# Patient Record
Sex: MALE | Race: WHITE | NOT HISPANIC OR LATINO | Employment: OTHER | URBAN - METROPOLITAN AREA
[De-identification: names, ages, dates, MRNs, and addresses within clinical notes are randomized per-mention and may not be internally consistent; named-entity substitution may affect disease eponyms.]

---

## 2023-03-17 ENCOUNTER — TELEPHONE (OUTPATIENT)
Dept: PRIMARY CARE | Facility: CLINIC | Age: 83
End: 2023-03-17
Payer: MEDICARE

## 2023-03-21 DIAGNOSIS — E78.1 HYPERTRIGLYCERIDEMIA: ICD-10-CM

## 2023-03-21 DIAGNOSIS — N18.9 CHRONIC KIDNEY DISEASE, UNSPECIFIED CKD STAGE: ICD-10-CM

## 2023-03-21 DIAGNOSIS — N42.9 BENIGN PROSTATIC DISEASE: ICD-10-CM

## 2023-04-13 ENCOUNTER — LAB (OUTPATIENT)
Dept: LAB | Facility: LAB | Age: 83
End: 2023-04-13
Payer: MEDICARE

## 2023-04-13 DIAGNOSIS — N18.9 CHRONIC KIDNEY DISEASE, UNSPECIFIED CKD STAGE: ICD-10-CM

## 2023-04-13 DIAGNOSIS — E78.1 HYPERTRIGLYCERIDEMIA: ICD-10-CM

## 2023-04-13 DIAGNOSIS — N42.9 BENIGN PROSTATIC DISEASE: ICD-10-CM

## 2023-04-13 LAB
ALANINE AMINOTRANSFERASE (SGPT) (U/L) IN SER/PLAS: 16 U/L (ref 10–52)
ALBUMIN (G/DL) IN SER/PLAS: 3.9 G/DL (ref 3.4–5)
ALKALINE PHOSPHATASE (U/L) IN SER/PLAS: 52 U/L (ref 33–136)
ANION GAP IN SER/PLAS: 14 MMOL/L (ref 10–20)
ASPARTATE AMINOTRANSFERASE (SGOT) (U/L) IN SER/PLAS: 17 U/L (ref 9–39)
BASOPHILS (10*3/UL) IN BLOOD BY AUTOMATED COUNT: 0.06 X10E9/L (ref 0–0.1)
BASOPHILS/100 LEUKOCYTES IN BLOOD BY AUTOMATED COUNT: 1 % (ref 0–2)
BILIRUBIN TOTAL (MG/DL) IN SER/PLAS: 0.6 MG/DL (ref 0–1.2)
CALCIUM (MG/DL) IN SER/PLAS: 9.7 MG/DL (ref 8.6–10.6)
CARBON DIOXIDE, TOTAL (MMOL/L) IN SER/PLAS: 25 MMOL/L (ref 21–32)
CHLORIDE (MMOL/L) IN SER/PLAS: 108 MMOL/L (ref 98–107)
CHOLESTEROL (MG/DL) IN SER/PLAS: 131 MG/DL (ref 0–199)
CHOLESTEROL IN HDL (MG/DL) IN SER/PLAS: 34.9 MG/DL
CHOLESTEROL/HDL RATIO: 3.8
CREATININE (MG/DL) IN SER/PLAS: 1.88 MG/DL (ref 0.5–1.3)
EOSINOPHILS (10*3/UL) IN BLOOD BY AUTOMATED COUNT: 0.39 X10E9/L (ref 0–0.4)
EOSINOPHILS/100 LEUKOCYTES IN BLOOD BY AUTOMATED COUNT: 6.6 % (ref 0–6)
ERYTHROCYTE DISTRIBUTION WIDTH (RATIO) BY AUTOMATED COUNT: 13.7 % (ref 11.5–14.5)
ERYTHROCYTE MEAN CORPUSCULAR HEMOGLOBIN CONCENTRATION (G/DL) BY AUTOMATED: 32.2 G/DL (ref 32–36)
ERYTHROCYTE MEAN CORPUSCULAR VOLUME (FL) BY AUTOMATED COUNT: 101 FL (ref 80–100)
ERYTHROCYTES (10*6/UL) IN BLOOD BY AUTOMATED COUNT: 4.43 X10E12/L (ref 4.5–5.9)
GFR MALE: 35 ML/MIN/1.73M2
GLUCOSE (MG/DL) IN SER/PLAS: 93 MG/DL (ref 74–99)
HEMATOCRIT (%) IN BLOOD BY AUTOMATED COUNT: 44.7 % (ref 41–52)
HEMOGLOBIN (G/DL) IN BLOOD: 14.4 G/DL (ref 13.5–17.5)
IMMATURE GRANULOCYTES/100 LEUKOCYTES IN BLOOD BY AUTOMATED COUNT: 0.3 % (ref 0–0.9)
LDL: 52 MG/DL (ref 0–99)
LEUKOCYTES (10*3/UL) IN BLOOD BY AUTOMATED COUNT: 5.9 X10E9/L (ref 4.4–11.3)
LYMPHOCYTES (10*3/UL) IN BLOOD BY AUTOMATED COUNT: 2.26 X10E9/L (ref 0.8–3)
LYMPHOCYTES/100 LEUKOCYTES IN BLOOD BY AUTOMATED COUNT: 38.5 % (ref 13–44)
MONOCYTES (10*3/UL) IN BLOOD BY AUTOMATED COUNT: 0.57 X10E9/L (ref 0.05–0.8)
MONOCYTES/100 LEUKOCYTES IN BLOOD BY AUTOMATED COUNT: 9.7 % (ref 2–10)
NEUTROPHILS (10*3/UL) IN BLOOD BY AUTOMATED COUNT: 2.57 X10E9/L (ref 1.6–5.5)
NEUTROPHILS/100 LEUKOCYTES IN BLOOD BY AUTOMATED COUNT: 43.9 % (ref 40–80)
NON HDL CHOLESTEROL: 96 MG/DL
NRBC (PER 100 WBCS) BY AUTOMATED COUNT: 0 /100 WBC (ref 0–0)
PLATELETS (10*3/UL) IN BLOOD AUTOMATED COUNT: 154 X10E9/L (ref 150–450)
POTASSIUM (MMOL/L) IN SER/PLAS: 5.1 MMOL/L (ref 3.5–5.3)
PROSTATE SPECIFIC AG (NG/ML) IN SER/PLAS: 0.33 NG/ML (ref 0–4)
PROTEIN TOTAL: 7.3 G/DL (ref 6.4–8.2)
SODIUM (MMOL/L) IN SER/PLAS: 142 MMOL/L (ref 136–145)
THYROTROPIN (MIU/L) IN SER/PLAS BY DETECTION LIMIT <= 0.05 MIU/L: 5.73 MIU/L (ref 0.44–3.98)
THYROXINE (T4) FREE (NG/DL) IN SER/PLAS: 0.91 NG/DL (ref 0.78–1.48)
TRIGLYCERIDE (MG/DL) IN SER/PLAS: 223 MG/DL (ref 0–149)
UREA NITROGEN (MG/DL) IN SER/PLAS: 29 MG/DL (ref 6–23)
VLDL: 45 MG/DL (ref 0–40)

## 2023-04-13 PROCEDURE — 84153 ASSAY OF PSA TOTAL: CPT

## 2023-04-13 PROCEDURE — 80061 LIPID PANEL: CPT

## 2023-04-13 PROCEDURE — 36415 COLL VENOUS BLD VENIPUNCTURE: CPT

## 2023-04-13 PROCEDURE — 84439 ASSAY OF FREE THYROXINE: CPT

## 2023-04-13 PROCEDURE — 84443 ASSAY THYROID STIM HORMONE: CPT

## 2023-04-13 PROCEDURE — 80053 COMPREHEN METABOLIC PANEL: CPT

## 2023-04-13 PROCEDURE — 85025 COMPLETE CBC W/AUTO DIFF WBC: CPT

## 2023-04-20 ENCOUNTER — OFFICE VISIT (OUTPATIENT)
Dept: PRIMARY CARE | Facility: CLINIC | Age: 83
End: 2023-04-20
Payer: MEDICARE

## 2023-04-20 VITALS
HEART RATE: 64 BPM | TEMPERATURE: 97.7 F | WEIGHT: 224 LBS | OXYGEN SATURATION: 96 % | BODY MASS INDEX: 35.16 KG/M2 | SYSTOLIC BLOOD PRESSURE: 130 MMHG | HEIGHT: 67 IN | DIASTOLIC BLOOD PRESSURE: 70 MMHG

## 2023-04-20 DIAGNOSIS — M21.372 LEFT FOOT DROP: ICD-10-CM

## 2023-04-20 DIAGNOSIS — G62.89 OTHER POLYNEUROPATHY: ICD-10-CM

## 2023-04-20 DIAGNOSIS — M54.16 LEFT LUMBAR RADICULOPATHY: ICD-10-CM

## 2023-04-20 DIAGNOSIS — Z00.00 ENCOUNTER FOR ANNUAL WELLNESS VISIT (AWV) IN MEDICARE PATIENT: Primary | ICD-10-CM

## 2023-04-20 DIAGNOSIS — Z71.89 ADVANCE DIRECTIVE DISCUSSED WITH PATIENT: ICD-10-CM

## 2023-04-20 DIAGNOSIS — E66.01 MORBID OBESITY (MULTI): ICD-10-CM

## 2023-04-20 DIAGNOSIS — R06.02 SHORTNESS OF BREATH: ICD-10-CM

## 2023-04-20 DIAGNOSIS — H35.033 HYPERTENSIVE RETINOPATHY, BILATERAL: ICD-10-CM

## 2023-04-20 DIAGNOSIS — I69.359 HEMIPARESIS AFFECTING DOMINANT SIDE AS LATE EFFECT OF CEREBROVASCULAR ACCIDENT (MULTI): ICD-10-CM

## 2023-04-20 DIAGNOSIS — Z13.89 ENCOUNTER FOR SCREENING FOR OTHER DISORDER: ICD-10-CM

## 2023-04-20 DIAGNOSIS — Z71.89 CARDIAC RISK COUNSELING: ICD-10-CM

## 2023-04-20 DIAGNOSIS — I10 BENIGN ESSENTIAL HYPERTENSION: ICD-10-CM

## 2023-04-20 DIAGNOSIS — Z00.00 ROUTINE GENERAL MEDICAL EXAMINATION AT HEALTH CARE FACILITY: ICD-10-CM

## 2023-04-20 DIAGNOSIS — N18.32 STAGE 3B CHRONIC KIDNEY DISEASE (MULTI): ICD-10-CM

## 2023-04-20 DIAGNOSIS — L30.9 DERMATITIS: ICD-10-CM

## 2023-04-20 DIAGNOSIS — N40.0 BENIGN PROSTATIC HYPERPLASIA WITHOUT LOWER URINARY TRACT SYMPTOMS: ICD-10-CM

## 2023-04-20 PROBLEM — M10.9 GOUT: Status: ACTIVE | Noted: 2023-04-20

## 2023-04-20 PROBLEM — E53.8 VITAMIN B12 DEFICIENCY: Status: ACTIVE | Noted: 2023-04-20

## 2023-04-20 PROBLEM — F41.9 ANXIETY: Status: ACTIVE | Noted: 2023-04-20

## 2023-04-20 PROBLEM — N18.9 CHRONIC KIDNEY DISEASE: Status: RESOLVED | Noted: 2023-04-20 | Resolved: 2023-04-20

## 2023-04-20 PROBLEM — R91.1 LUNG NODULE: Status: ACTIVE | Noted: 2023-04-20

## 2023-04-20 PROBLEM — M79.2 NEURALGIA: Status: ACTIVE | Noted: 2023-04-20

## 2023-04-20 PROBLEM — G62.9 PERIPHERAL NEUROPATHY: Status: ACTIVE | Noted: 2023-04-20

## 2023-04-20 PROBLEM — M10.9 GOUT: Status: RESOLVED | Noted: 2023-04-20 | Resolved: 2023-04-20

## 2023-04-20 PROBLEM — R91.1 LUNG NODULE: Status: RESOLVED | Noted: 2023-04-20 | Resolved: 2023-04-20

## 2023-04-20 PROBLEM — M79.605 LEFT LEG PAIN: Status: ACTIVE | Noted: 2023-04-20

## 2023-04-20 PROBLEM — F41.9 ANXIETY: Status: RESOLVED | Noted: 2023-04-20 | Resolved: 2023-04-20

## 2023-04-20 PROBLEM — N18.9 CHRONIC KIDNEY DISEASE: Status: ACTIVE | Noted: 2023-04-20

## 2023-04-20 PROCEDURE — G0444 DEPRESSION SCREEN ANNUAL: HCPCS | Performed by: FAMILY MEDICINE

## 2023-04-20 PROCEDURE — G0439 PPPS, SUBSEQ VISIT: HCPCS | Performed by: FAMILY MEDICINE

## 2023-04-20 PROCEDURE — 99497 ADVNCD CARE PLAN 30 MIN: CPT | Performed by: FAMILY MEDICINE

## 2023-04-20 PROCEDURE — 1160F RVW MEDS BY RX/DR IN RCRD: CPT | Performed by: FAMILY MEDICINE

## 2023-04-20 PROCEDURE — 3078F DIAST BP <80 MM HG: CPT | Performed by: FAMILY MEDICINE

## 2023-04-20 PROCEDURE — 99214 OFFICE O/P EST MOD 30 MIN: CPT | Performed by: FAMILY MEDICINE

## 2023-04-20 PROCEDURE — 1170F FXNL STATUS ASSESSED: CPT | Performed by: FAMILY MEDICINE

## 2023-04-20 PROCEDURE — 99397 PER PM REEVAL EST PAT 65+ YR: CPT | Performed by: FAMILY MEDICINE

## 2023-04-20 PROCEDURE — G0446 INTENS BEHAVE THER CARDIO DX: HCPCS | Performed by: FAMILY MEDICINE

## 2023-04-20 PROCEDURE — 1159F MED LIST DOCD IN RCRD: CPT | Performed by: FAMILY MEDICINE

## 2023-04-20 PROCEDURE — 1036F TOBACCO NON-USER: CPT | Performed by: FAMILY MEDICINE

## 2023-04-20 PROCEDURE — 3075F SYST BP GE 130 - 139MM HG: CPT | Performed by: FAMILY MEDICINE

## 2023-04-20 RX ORDER — ASPIRIN 81 MG/1
81 TABLET ORAL DAILY
COMMUNITY
Start: 2018-11-26

## 2023-04-20 RX ORDER — GABAPENTIN 300 MG/1
300 CAPSULE ORAL
COMMUNITY
End: 2023-08-07

## 2023-04-20 RX ORDER — LOSARTAN POTASSIUM 50 MG/1
50 TABLET ORAL DAILY
COMMUNITY
End: 2023-05-08 | Stop reason: SDUPTHER

## 2023-04-20 RX ORDER — MIRTAZAPINE 15 MG/1
15 TABLET, FILM COATED ORAL NIGHTLY
COMMUNITY
End: 2023-05-08 | Stop reason: SDUPTHER

## 2023-04-20 RX ORDER — TRIAMCINOLONE ACETONIDE 1 MG/G
CREAM TOPICAL 2 TIMES DAILY
Qty: 454 G | Refills: 0 | Status: SHIPPED | OUTPATIENT
Start: 2023-04-20

## 2023-04-20 RX ORDER — CLOPIDOGREL BISULFATE 75 MG/1
75 TABLET ORAL DAILY
COMMUNITY
End: 2023-06-01 | Stop reason: SDUPTHER

## 2023-04-20 RX ORDER — BUSPIRONE HYDROCHLORIDE 15 MG/1
15 TABLET ORAL EVERY 12 HOURS
COMMUNITY
End: 2023-05-08 | Stop reason: SDUPTHER

## 2023-04-20 RX ORDER — ALLOPURINOL 100 MG/1
100 TABLET ORAL DAILY
COMMUNITY

## 2023-04-20 RX ORDER — SIMVASTATIN 20 MG/1
20 TABLET, FILM COATED ORAL DAILY
COMMUNITY
End: 2023-06-01 | Stop reason: SDUPTHER

## 2023-04-20 ASSESSMENT — ENCOUNTER SYMPTOMS
LIGHT-HEADEDNESS: 0
CHEST TIGHTNESS: 0
VOICE CHANGE: 0
ABDOMINAL PAIN: 0
PALPITATIONS: 0
ARTHRALGIAS: 0
SEIZURES: 0
RHINORRHEA: 1
SPEECH DIFFICULTY: 0
NAUSEA: 0
BACK PAIN: 0
POLYDIPSIA: 0
ANAL BLEEDING: 0
EYE PAIN: 0
ADENOPATHY: 0
COUGH: 0
CARDIOVASCULAR NEGATIVE: 1
TROUBLE SWALLOWING: 0
NERVOUS/ANXIOUS: 0
WOUND: 0
VOMITING: 0
FATIGUE: 0
FACIAL ASYMMETRY: 0
MYALGIAS: 0
SINUS PAIN: 0
APPETITE CHANGE: 0
AGITATION: 0
CHILLS: 0
FLANK PAIN: 0
SORE THROAT: 0
FREQUENCY: 0
DECREASED CONCENTRATION: 0
DYSURIA: 0
ABDOMINAL DISTENTION: 0
NUMBNESS: 1
WEAKNESS: 1
LOSS OF SENSATION IN FEET: 0
EYE ITCHING: 0
DIAPHORESIS: 0
CONSTIPATION: 0
SLEEP DISTURBANCE: 0
DIZZINESS: 0
BLOOD IN STOOL: 0
EYE DISCHARGE: 0
EYE REDNESS: 0
BRUISES/BLEEDS EASILY: 0
HEADACHES: 0
DEPRESSION: 0
DIARRHEA: 0
SINUS PRESSURE: 0
DIFFICULTY URINATING: 0
OCCASIONAL FEELINGS OF UNSTEADINESS: 1
UNEXPECTED WEIGHT CHANGE: 0

## 2023-04-20 ASSESSMENT — ACTIVITIES OF DAILY LIVING (ADL)
MANAGING_FINANCES: INDEPENDENT
BATHING: INDEPENDENT
GROCERY_SHOPPING: INDEPENDENT
DRESSING: INDEPENDENT
DOING_HOUSEWORK: INDEPENDENT
TAKING_MEDICATION: INDEPENDENT

## 2023-04-20 ASSESSMENT — PATIENT HEALTH QUESTIONNAIRE - PHQ9
SUM OF ALL RESPONSES TO PHQ9 QUESTIONS 1 AND 2: 0
1. LITTLE INTEREST OR PLEASURE IN DOING THINGS: NOT AT ALL
2. FEELING DOWN, DEPRESSED OR HOPELESS: NOT AT ALL

## 2023-04-20 NOTE — PROGRESS NOTES
Subjective   Reason for Visit: Melchor Fang is an 83 y.o. male here for a Medicare Wellness visit.     Past Medical, Surgical, and Family History reviewed and updated in chart.    Reviewed all medications by prescribing practitioner or clinical pharmacist (such as prescriptions, OTCs, herbal therapies and supplements) and documented in the medical record.  Eye exam UTD    False teeth does not see the dentist on a regular basis.    Is up-to-date on eye exams.  Drinks 3-4 beers a day.    Non-smoker.  Exercises by walking to the garden regularly.    Patient had no difficulties with headaches or double vision or blurry vision no chest pain or shortness of breath.  No abdominal pain or discomfort.  No troubles with swelling of the legs or feet.  No fevers no chills or night sweats.  No troubles with nausea or vomiting.    Patient had some persistent right hemiparesis from after having a stroke almost all completely recovered still cannot write well with the right hand.  Has had a little bit of weakness down into the right leg.  Patient has some pain discomfort in the left leg that was noted to have a postherpetic neuralgia    Non Smoker.    3-4 beers a day  HPI  Having trouble with feet had plantar fascitis.  Took some prednisone.    Patient Care Team:  Tiago Troy DO as PCP - General  Tiago Troy DO as PCP - [employer] PCP     Review of Systems   Constitutional:  Negative for appetite change, chills, diaphoresis, fatigue and unexpected weight change.   HENT:  Positive for rhinorrhea. Negative for congestion, dental problem, ear discharge, ear pain, hearing loss, mouth sores, nosebleeds, postnasal drip, sinus pressure, sinus pain, sore throat, tinnitus, trouble swallowing and voice change.    Eyes:  Negative for pain, discharge, redness, itching and visual disturbance.        Had  retinopathy   Respiratory:  Negative for cough and chest tightness.    Cardiovascular: Negative.  Negative for  "chest pain, palpitations and leg swelling.   Gastrointestinal:  Negative for abdominal distention, abdominal pain, anal bleeding, blood in stool, constipation, diarrhea, nausea and vomiting.   Endocrine: Negative for cold intolerance, heat intolerance, polydipsia and polyuria.   Genitourinary:  Negative for difficulty urinating, dysuria, enuresis (once at night), flank pain, frequency, scrotal swelling and testicular pain.   Musculoskeletal:  Negative for arthralgias, back pain and myalgias.   Skin:  Negative for rash and wound.   Allergic/Immunologic: Negative for environmental allergies, food allergies and immunocompromised state.   Neurological:  Positive for weakness and numbness. Negative for dizziness, seizures, facial asymmetry, speech difficulty, light-headedness and headaches.   Hematological:  Negative for adenopathy. Does not bruise/bleed easily.   Psychiatric/Behavioral:  Negative for agitation, behavioral problems, decreased concentration, sleep disturbance and suicidal ideas. The patient is not nervous/anxious.        Objective   Vitals:  /70   Pulse 64   Temp 36.5 °C (97.7 °F)   Ht 1.689 m (5' 6.5\")   Wt 102 kg (224 lb)   SpO2 96%   BMI 35.61 kg/m²       Physical Exam  Constitutional:       General: He is not in acute distress.     Appearance: Normal appearance. He is obese. He is not ill-appearing or toxic-appearing.   HENT:      Head: Normocephalic.      Right Ear: Tympanic membrane normal.      Left Ear: Tympanic membrane normal.      Nose: Nose normal.      Mouth/Throat:      Mouth: Mucous membranes are moist.   Eyes:      Extraocular Movements: Extraocular movements intact.      Conjunctiva/sclera: Conjunctivae normal.      Pupils: Pupils are equal, round, and reactive to light.   Cardiovascular:      Rate and Rhythm: Normal rate and regular rhythm.      Pulses: Normal pulses.      Heart sounds: Normal heart sounds.   Pulmonary:      Effort: Pulmonary effort is normal.      Breath " sounds: Normal breath sounds. No wheezing or rhonchi.   Abdominal:      General: Abdomen is flat. Bowel sounds are normal. There is no distension.      Palpations: Abdomen is soft.      Tenderness: There is no abdominal tenderness. There is no right CVA tenderness or rebound.      Hernia: No hernia is present.   Genitourinary:     Penis: Normal.    Musculoskeletal:         General: Normal range of motion.      Cervical back: Normal range of motion.      Right lower leg: No edema.   Skin:     General: Skin is warm and dry.      Capillary Refill: Capillary refill takes less than 2 seconds.      Findings: No bruising.   Neurological:      General: No focal deficit present.      Mental Status: He is alert and oriented to person, place, and time. Mental status is at baseline.      Cranial Nerves: No cranial nerve deficit.      Sensory: No sensory deficit.      Motor: No weakness.      Coordination: Coordination normal.      Gait: Gait normal.      Deep Tendon Reflexes: Reflexes normal.      Comments: Some shuffling of the gait noted.    Handgrip appears to be strong bilaterally.  Slight incoordination of right hand.    The right leg reveals good range of motion.  Left leg reveals good range of motion.  Deep tendon reflexes are equal and strong bilaterally.    Finger-nose testing intact as well.   Psychiatric:         Mood and Affect: Mood normal.         Behavior: Behavior normal.     Some issues with shuffling of the gait.    Data reflexes are equal and strong.    Some dermatitis noted of the skin on the back area.    Refilling cream to be used as noted in the triamcinolone cream will be used as needed.  Cardiovascular risk discussed and, if needed, life style modifications recommended, including nutritional choices, excercise,and elimination of habits contributing to risk. See ASCVD risk  plus for data discussed regarding risk and risk reduction opportunities. Aspirin use/disuse was discussed after reviewing  updated guidelines.    Assessment/Plan   Problem List Items Addressed This Visit          Nervous    Left lumbar radiculopathy    Peripheral neuropathy       Genitourinary    Stage 3b chronic kidney disease    Benign prostatic hyperplasia without lower urinary tract symptoms       Musculoskeletal    Left foot drop       Endocrine/Metabolic    Morbid obesity (CMS/HCC)       Other    Encounter for annual wellness visit (AWV) in Medicare patient - Primary     Other Visit Diagnoses       Routine general medical examination at health care facility        Hemiparesis affecting dominant side as late effect of cerebrovascular accident (CMS/[facility])        Dermatitis        Relevant Medications    triamcinolone (Kenalog) 0.1 % cream               Advance Care Planning Note     Discussion Date: 04/20/23   Discussion Participants: patient    The patient wishes to discuss Advance Care Planning today and the following is a brief summary of our discussion.     Patient has capacity to make their own medical decisions: Yes  Health Care Agent/Surrogate Decision Maker documented in chart: No    Documents on file and valid:  Advance Directive/Living Will: Yes   Health Care Power of : Yes  Other: no DNR    Communication of Medical Status/Prognosis:   good     Communication of Treatment Goals/Options:   good     Treatment Decisions  good    Time Statement: Total face to face time spent on advance care planning was 16 minutes with 16 minutes spent in counseling, including the explanation.    Tiago rToy, DO

## 2023-04-20 NOTE — ASSESSMENT & PLAN NOTE
Please continue to work toward weight loss.  Please try to exercise 40 minutes 4 times weekly and watch diet closely

## 2023-04-20 NOTE — PATIENT INSTRUCTIONS
Reviewed labs with you today.    TSH is slightly elevated, recheck TSH level in 4 weeks.    I have recommended seeing neurology regarding persistent troubles with postherpetic neuralgia but you do not want to do so.    Please continue to use a cane for walking.    Depression has been under good control.  Labs otherwise been reviewed and stable.  EKG is being performed as well.    Please cut back alcohol intake to 1 drink daily.    Please follow 1500-calorie diet to try to help with weight reduction

## 2023-05-08 ENCOUNTER — TELEPHONE (OUTPATIENT)
Dept: PRIMARY CARE | Facility: CLINIC | Age: 83
End: 2023-05-08
Payer: MEDICARE

## 2023-05-08 DIAGNOSIS — I10 BENIGN ESSENTIAL HYPERTENSION: ICD-10-CM

## 2023-05-08 DIAGNOSIS — G47.00 INSOMNIA, UNSPECIFIED TYPE: ICD-10-CM

## 2023-05-08 PROBLEM — K62.5 RECTAL BLEEDING: Status: ACTIVE | Noted: 2023-05-08

## 2023-05-08 RX ORDER — BUSPIRONE HYDROCHLORIDE 15 MG/1
15 TABLET ORAL EVERY 12 HOURS
Qty: 90 TABLET | Refills: 0 | Status: SHIPPED | OUTPATIENT
Start: 2023-05-08 | End: 2023-05-26

## 2023-05-08 RX ORDER — LOSARTAN POTASSIUM 50 MG/1
50 TABLET ORAL DAILY
Qty: 90 TABLET | Refills: 0 | Status: SHIPPED | OUTPATIENT
Start: 2023-05-08 | End: 2023-05-26

## 2023-05-08 RX ORDER — MIRTAZAPINE 15 MG/1
15 TABLET, FILM COATED ORAL NIGHTLY
Qty: 90 TABLET | Refills: 0 | Status: SHIPPED | OUTPATIENT
Start: 2023-05-08 | End: 2023-05-26

## 2023-05-08 NOTE — TELEPHONE ENCOUNTER
Pt needs refills on     losartan (Cozaar) 50 mg tablet  Take 1 tablet (50 mg) by mouth once daily    losartan (Cozaar) 50 mg tablet     busPIRone (Buspar) 15 mg tablet     mirtazapine (Remeron) 15 mg tablet     [facility]

## 2023-05-26 DIAGNOSIS — G47.00 INSOMNIA, UNSPECIFIED TYPE: ICD-10-CM

## 2023-05-26 DIAGNOSIS — I10 BENIGN ESSENTIAL HYPERTENSION: ICD-10-CM

## 2023-05-26 RX ORDER — LOSARTAN POTASSIUM 50 MG/1
50 TABLET ORAL DAILY
Qty: 90 TABLET | Refills: 0 | Status: SHIPPED | OUTPATIENT
Start: 2023-05-26 | End: 2023-08-28 | Stop reason: SDUPTHER

## 2023-05-26 RX ORDER — MIRTAZAPINE 15 MG/1
15 TABLET, FILM COATED ORAL NIGHTLY
Qty: 90 TABLET | Refills: 0 | Status: SHIPPED | OUTPATIENT
Start: 2023-05-26 | End: 2023-10-28

## 2023-05-26 RX ORDER — BUSPIRONE HYDROCHLORIDE 15 MG/1
15 TABLET ORAL EVERY 12 HOURS
Qty: 90 TABLET | Refills: 0 | Status: SHIPPED | OUTPATIENT
Start: 2023-05-26 | End: 2023-08-10

## 2023-05-31 ENCOUNTER — TELEPHONE (OUTPATIENT)
Dept: PRIMARY CARE | Facility: CLINIC | Age: 83
End: 2023-05-31
Payer: MEDICARE

## 2023-06-01 DIAGNOSIS — I69.359 HEMIPARESIS AFFECTING DOMINANT SIDE AS LATE EFFECT OF CEREBROVASCULAR ACCIDENT (MULTI): Primary | ICD-10-CM

## 2023-06-01 RX ORDER — CLOPIDOGREL BISULFATE 75 MG/1
75 TABLET ORAL DAILY
Qty: 90 TABLET | Refills: 1 | Status: SHIPPED | OUTPATIENT
Start: 2023-06-01 | End: 2023-10-28

## 2023-06-01 RX ORDER — SIMVASTATIN 20 MG/1
20 TABLET, FILM COATED ORAL DAILY
Qty: 90 TABLET | Refills: 1 | Status: SHIPPED | OUTPATIENT
Start: 2023-06-01 | End: 2023-10-28

## 2023-08-05 DIAGNOSIS — G62.9 POLYNEUROPATHY, UNSPECIFIED: ICD-10-CM

## 2023-08-07 RX ORDER — GABAPENTIN 300 MG/1
CAPSULE ORAL
Qty: 180 CAPSULE | Refills: 1 | Status: SHIPPED | OUTPATIENT
Start: 2023-08-07

## 2023-08-09 DIAGNOSIS — G47.00 INSOMNIA, UNSPECIFIED TYPE: ICD-10-CM

## 2023-08-10 RX ORDER — BUSPIRONE HYDROCHLORIDE 15 MG/1
15 TABLET ORAL EVERY 12 HOURS
Qty: 90 TABLET | Refills: 1 | Status: SHIPPED | OUTPATIENT
Start: 2023-08-10 | End: 2023-08-28 | Stop reason: SDUPTHER

## 2023-08-21 ENCOUNTER — LAB (OUTPATIENT)
Dept: LAB | Facility: LAB | Age: 83
End: 2023-08-21
Payer: MEDICARE

## 2023-08-21 DIAGNOSIS — G62.89 OTHER POLYNEUROPATHY: ICD-10-CM

## 2023-08-21 PROCEDURE — 36415 COLL VENOUS BLD VENIPUNCTURE: CPT

## 2023-08-21 PROCEDURE — 84443 ASSAY THYROID STIM HORMONE: CPT

## 2023-08-22 LAB — THYROTROPIN (MIU/L) IN SER/PLAS BY DETECTION LIMIT <= 0.05 MIU/L: 3.86 MIU/L (ref 0.44–3.98)

## 2023-08-28 ENCOUNTER — OFFICE VISIT (OUTPATIENT)
Dept: PRIMARY CARE | Facility: CLINIC | Age: 83
End: 2023-08-28
Payer: MEDICARE

## 2023-08-28 VITALS
DIASTOLIC BLOOD PRESSURE: 68 MMHG | SYSTOLIC BLOOD PRESSURE: 132 MMHG | WEIGHT: 222 LBS | OXYGEN SATURATION: 97 % | HEIGHT: 67 IN | BODY MASS INDEX: 34.84 KG/M2 | TEMPERATURE: 97.2 F | HEART RATE: 103 BPM

## 2023-08-28 DIAGNOSIS — B02.29 POST HERPETIC NEURALGIA: ICD-10-CM

## 2023-08-28 DIAGNOSIS — G62.89 OTHER POLYNEUROPATHY: ICD-10-CM

## 2023-08-28 DIAGNOSIS — M54.16 LEFT LUMBAR RADICULOPATHY: ICD-10-CM

## 2023-08-28 DIAGNOSIS — Z86.73 HISTORY OF COMPLETED STROKE: ICD-10-CM

## 2023-08-28 DIAGNOSIS — R53.1 WEAKNESS: Primary | ICD-10-CM

## 2023-08-28 DIAGNOSIS — G47.00 INSOMNIA, UNSPECIFIED TYPE: ICD-10-CM

## 2023-08-28 DIAGNOSIS — I10 BENIGN ESSENTIAL HYPERTENSION: ICD-10-CM

## 2023-08-28 PROCEDURE — 3075F SYST BP GE 130 - 139MM HG: CPT | Performed by: FAMILY MEDICINE

## 2023-08-28 PROCEDURE — 3078F DIAST BP <80 MM HG: CPT | Performed by: FAMILY MEDICINE

## 2023-08-28 PROCEDURE — 1160F RVW MEDS BY RX/DR IN RCRD: CPT | Performed by: FAMILY MEDICINE

## 2023-08-28 PROCEDURE — 1036F TOBACCO NON-USER: CPT | Performed by: FAMILY MEDICINE

## 2023-08-28 PROCEDURE — 1159F MED LIST DOCD IN RCRD: CPT | Performed by: FAMILY MEDICINE

## 2023-08-28 PROCEDURE — 99214 OFFICE O/P EST MOD 30 MIN: CPT | Performed by: FAMILY MEDICINE

## 2023-08-28 RX ORDER — BUSPIRONE HYDROCHLORIDE 15 MG/1
15 TABLET ORAL EVERY 12 HOURS
Qty: 180 TABLET | Refills: 1 | Status: SHIPPED | OUTPATIENT
Start: 2023-08-28 | End: 2023-09-28

## 2023-08-28 RX ORDER — LOSARTAN POTASSIUM 50 MG/1
50 TABLET ORAL DAILY
Qty: 90 TABLET | Refills: 1 | Status: SHIPPED | OUTPATIENT
Start: 2023-08-28 | End: 2024-04-22

## 2023-08-28 ASSESSMENT — ENCOUNTER SYMPTOMS
LIGHT-HEADEDNESS: 0
DECREASED CONCENTRATION: 0
ARTHRALGIAS: 1
HALLUCINATIONS: 0
SEIZURES: 0
WEAKNESS: 0
CONSTITUTIONAL NEGATIVE: 1
NECK PAIN: 0
RESPIRATORY NEGATIVE: 1
MYALGIAS: 0
BACK PAIN: 1
CARDIOVASCULAR NEGATIVE: 1
DIZZINESS: 0
TREMORS: 0

## 2023-08-28 ASSESSMENT — PATIENT HEALTH QUESTIONNAIRE - PHQ9
1. LITTLE INTEREST OR PLEASURE IN DOING THINGS: NOT AT ALL
SUM OF ALL RESPONSES TO PHQ9 QUESTIONS 1 AND 2: 0
2. FEELING DOWN, DEPRESSED OR HOPELESS: NOT AT ALL

## 2023-08-28 NOTE — PROGRESS NOTES
Subjective   Patient ID: Melchor Fang is a 83 y.o. male who presents for Groin Pain.    Patient frustrated feels like he is getting weaker.  Patient with history of neuropathy and lumbar radiculopathy and postherpetic neuralgia of the left leg also has had history of stroke which affected the right side of the body.  Feels that overall he is getting somewhat weaker.  Wanted to know now if there is anything else he could do and would like to have a referral to neurology.    He does get a little bit of numbness some tingling in the legs and feet.  Did have some burning type sensation on the medial aspect of the left thigh where he had history of postherpetic neuralgia.  Patient did have an MRI of the femur done which showed no acute abnormalities.  Patient had EMG studies and nerve conduction testing and MRI of the LS spine in 2021.  Evidence of degenerative changes.  Evidence of peripheral neuropathy but no evidence of significant radiculopathy was appreciated did undergo physical therapy and was doing somewhat better but now having more difficulty patient had been referred to Dr. Zepeda in the past.        Feeling OK.     Balance off.    See Dr. Riddle        Groin Pain  The patient's pertinent negatives include no penile pain.    patient presents for follow-up on groin pain.  Patient had pain discomfort in groin area.  Leg weakness.    Neuropathy L leg,        Neuropathy.  Review of Systems   Constitutional: Negative.    HENT: Negative.     Respiratory: Negative.     Cardiovascular: Negative.    Genitourinary:  Negative for penile pain.   Musculoskeletal:  Positive for arthralgias, back pain and gait problem. Negative for myalgias and neck pain.   Allergic/Immunologic: Negative for environmental allergies.   Neurological:  Negative for dizziness, tremors, seizures, weakness and light-headedness.   Psychiatric/Behavioral:  Negative for decreased concentration and hallucinations.        Objective   /68    "Pulse 103   Temp 36.2 °C (97.2 °F)   Ht 1.689 m (5' 6.5\")   Wt 101 kg (222 lb)   SpO2 97%   BMI 35.29 kg/m²   BSA Body surface area is 2.18 meters squared.      Physical Exam  Constitutional:       Appearance: Normal appearance.   Cardiovascular:      Rate and Rhythm: Normal rate and regular rhythm.   Pulmonary:      Effort: Pulmonary effort is normal.   Musculoskeletal:         General: Normal range of motion.      Cervical back: No rigidity.   Lymphadenopathy:      Cervical: No cervical adenopathy.   Skin:     General: Skin is warm.   Neurological:      Mental Status: He is alert and oriented to person, place, and time.      Cranial Nerves: No cranial nerve deficit.      Motor: Weakness present.      Gait: Gait abnormal.     Ambulating with cane in the office.    LS-spine revealed some pain with flexing forward with side bending and with rotation.  Deep tendon reflex appear to be diminished bilaterally.  Some weakness of the extensor houses longus tendon on the left side.    Right side revealed some diffuse weakness as well  Lab on 08/21/2023   Component Date Value Ref Range Status    TSH 08/21/2023 3.86  0.44 - 3.98 mIU/L Final     TSH testing is performed using different testing    methodology at [facility] than at other    [facility]. Direct result comparisons should    only be made within the same method.   Lab on 04/13/2023   Component Date Value Ref Range Status    WBC 04/13/2023 5.9  4.4 - 11.3 x10E9/L Final    nRBC 04/13/2023 0.0  0.0 - 0.0 /100 WBC Final    RBC 04/13/2023 4.43 (L)  4.50 - 5.90 x10E12/L Final    Hemoglobin 04/13/2023 14.4  13.5 - 17.5 g/dL Final    Hematocrit 04/13/2023 44.7  41.0 - 52.0 % Final    MCV 04/13/2023 101 (H)  80 - 100 fL Final    MCHC 04/13/2023 32.2  32.0 - 36.0 g/dL Final    Platelets 04/13/2023 154  150 - 450 x10E9/L Final    RDW 04/13/2023 13.7  11.5 - 14.5 % Final    Neutrophils % 04/13/2023 43.9  40.0 - 80.0 % Final    Immature Granulocytes %, " Automated 04/13/2023 0.3  0.0 - 0.9 % Final     Immature Granulocyte Count (IG) includes promyelocytes,    myelocytes and metamyelocytes but does not include bands.   Percent differential counts (%) should be interpreted in the   context of the absolute cell counts (cells/L).    Lymphocytes % 04/13/2023 38.5  13.0 - 44.0 % Final    Monocytes % 04/13/2023 9.7  2.0 - 10.0 % Final    Eosinophils % 04/13/2023 6.6  0.0 - 6.0 % Final    Basophils % 04/13/2023 1.0  0.0 - 2.0 % Final    Neutrophils Absolute 04/13/2023 2.57  1.60 - 5.50 x10E9/L Final    Lymphocytes Absolute 04/13/2023 2.26  0.80 - 3.00 x10E9/L Final    Monocytes Absolute 04/13/2023 0.57  0.05 - 0.80 x10E9/L Final    Eosinophils Absolute 04/13/2023 0.39  0.00 - 0.40 x10E9/L Final    Basophils Absolute 04/13/2023 0.06  0.00 - 0.10 x10E9/L Final    Glucose 04/13/2023 93  74 - 99 mg/dL Final    Sodium 04/13/2023 142  136 - 145 mmol/L Final    Potassium 04/13/2023 5.1  3.5 - 5.3 mmol/L Final    Chloride 04/13/2023 108 (H)  98 - 107 mmol/L Final    Bicarbonate 04/13/2023 25  21 - 32 mmol/L Final    Anion Gap 04/13/2023 14  10 - 20 mmol/L Final    Urea Nitrogen 04/13/2023 29 (H)  6 - 23 mg/dL Final    Creatinine 04/13/2023 1.88 (H)  0.50 - 1.30 mg/dL Final    GFR MALE 04/13/2023 35 (A)  >90 mL/min/1.73m2 Final     CALCULATIONS OF ESTIMATED GFR ARE PERFORMED   USING THE 2021 CKD-EPI STUDY REFIT EQUATION   WITHOUT THE RACE VARIABLE FOR THE IDMS-TRACEABLE   CREATININE METHODS.    https://jasn.asnjournals.org/content/early/2021/09/22/ASN.5649629044    Calcium 04/13/2023 9.7  8.6 - 10.6 mg/dL Final    Albumin 04/13/2023 3.9  3.4 - 5.0 g/dL Final    Alkaline Phosphatase 04/13/2023 52  33 - 136 U/L Final    Total Protein 04/13/2023 7.3  6.4 - 8.2 g/dL Final    AST 04/13/2023 17  9 - 39 U/L Final    Total Bilirubin 04/13/2023 0.6  0.0 - 1.2 mg/dL Final    ALT (SGPT) 04/13/2023 16  10 - 52 U/L Final     Patients treated with Sulfasalazine may generate    falsely decreased  results for ALT.    TSH 04/13/2023 5.73 (H)  0.44 - 3.98 mIU/L Final     TSH testing is performed using different testing    methodology at [facility] than at other    [facility]. Direct result comparisons should    only be made within the same method.    Cholesterol 04/13/2023 131  0 - 199 mg/dL Final    .      AGE      DESIRABLE   BORDERLINE HIGH   HIGH     0-19 Y     0 - 169       170 - 199     >/= 200    20-24 Y     0 - 189       190 - 224     >/= 225         >24 Y     0 - 199       200 - 239     >/= 240   **All ranges are based on fasting samples. Specific   therapeutic targets will vary based on patient-specific   cardiac risk.  .   Pediatric guidelines reference:Pediatrics 2011, 128(S5).   Adult guidelines reference: NCEP ATPIII Guidelines,     HERB 2001, 258:2486-97  .   Venipuncture immediately after or during the    administration of Metamizole may lead to falsely   low results. Testing should be performed immediately   prior to Metamizole dosing.    HDL 04/13/2023 34.9 (A)  mg/dL Final    .      AGE      VERY LOW   LOW     NORMAL    HIGH       0-19 Y       < 35   < 40     40-45     ----    20-24 Y       ----   < 40       >45     ----      >24 Y       ----   < 40     40-60      >60  .    Cholesterol/HDL Ratio 04/13/2023 3.8   Final    REF VALUES  DESIRABLE  < 3.4  HIGH RISK  > 5.0    LDL 04/13/2023 52  0 - 99 mg/dL Final    .                           NEAR      BORD      AGE      DESIRABLE  OPTIMAL    HIGH     HIGH     VERY HIGH     0-19 Y     0 - 109     ---    110-129   >/= 130     ----    20-24 Y     0 - 119     ---    120-159   >/= 160     ----      >24 Y     0 -  99   100-129  130-159   160-189     >/=190  .    VLDL 04/13/2023 45 (H)  0 - 40 mg/dL Final    Triglycerides 04/13/2023 223 (H)  0 - 149 mg/dL Final    .      AGE      DESIRABLE   BORDERLINE HIGH   HIGH     VERY HIGH   0 D-90 D    19 - 174         ----         ----        ----  91 D- 9 Y     0 -  74        75 -  99     >/=  100      ----    10-19 Y     0 -  89        90 - 129     >/= 130      ----    20-24 Y     0 - 114       115 - 149     >/= 150      ----         >24 Y     0 - 149       150 - 199    200- 499    >/= 500  .   Venipuncture immediately after or during the    administration of Metamizole may lead to falsely   low results. Testing should be performed immediately   prior to Metamizole dosing.    Non HDL Cholesterol 04/13/2023 96  mg/dL Final        AGE      DESIRABLE   BORDERLINE HIGH   HIGH     VERY HIGH     0-19 Y     0 - 119       120 - 144     >/= 145    >/= 160    20-24 Y     0 - 149       150 - 189     >/= 190      ----         >24 Y    30 MG/DL ABOVE LDL CHOLESTEROL GOAL  .    PSA 04/13/2023 0.33  0.00 - 4.00 ng/mL Final    The FDA requires that the method used for PSA assay be   reported to the physician. Values obtained with different   assay methods must not be used interchangeably. This test   was performed at [facility] using the Siemens  Kontiki PSA method, which is a sandwich immunoassay using   chemiluminescence for quantitation. The assay is approved  for measurement of prostate-specific antigen (PSA) in   serum and may be used in conjunction with a digital rectal  examination in men 50 years and older as an aid in   detection of prostate cancer.   5-Alpha-reductase inhibitors (e.g. Proscar, Finasteride,   Avodart, Dutasteride and Germania) for the treatment of BPH   have been shown to lower PSA levels by an average of 50%   after 6 months of treatment.    Free T4 04/13/2023 0.91  0.78 - 1.48 ng/dL Final     Thyroxine Free testing is performed using different testing    methodology at [facility] than at other    [facility]. Direct result comparisons should    only be made within the same method.   [facility] Encounter on 01/19/2023   Component Date Value Ref Range Status    WBC 01/19/2023 6.6  4.4 - 11.3 x10E9/L Final    nRBC 01/19/2023 0.0  0.0 - 0.0 /100 WBC Final    RBC  01/19/2023 4.54  4.50 - 5.90 x10E12/L Final    Hemoglobin 01/19/2023 15.0  13.5 - 17.5 g/dL Final    Hematocrit 01/19/2023 45.7  41.0 - 52.0 % Final    MCV 01/19/2023 101 (H)  80 - 100 fL Final    MCHC 01/19/2023 32.8  32.0 - 36.0 g/dL Final    Platelets 01/19/2023 182  150 - 450 x10E9/L Final    RDW 01/19/2023 13.1  11.5 - 14.5 % Final    Neutrophils % 01/19/2023 41.6  40.0 - 80.0 % Final    Immature Granulocytes %, Automated 01/19/2023 0.6  0.0 - 0.9 % Final    Comment:  Immature Granulocyte Count (IG) includes promyelocytes,    myelocytes and metamyelocytes but does not include bands.   Percent differential counts (%) should be interpreted in the   context of the absolute cell counts (cells/L).      Lymphocytes % 01/19/2023 40.2  13.0 - 44.0 % Final    Monocytes % 01/19/2023 9.7  2.0 - 10.0 % Final    Eosinophils % 01/19/2023 7.0  0.0 - 6.0 % Final    Basophils % 01/19/2023 0.9  0.0 - 2.0 % Final    Neutrophils Absolute 01/19/2023 2.75  1.60 - 5.50 x10E9/L Final    Lymphocytes Absolute 01/19/2023 2.66  0.80 - 3.00 x10E9/L Final    Monocytes Absolute 01/19/2023 0.64  0.05 - 0.80 x10E9/L Final    Eosinophils Absolute 01/19/2023 0.46 (H)  0.00 - 0.40 x10E9/L Final    Basophils Absolute 01/19/2023 0.06  0.00 - 0.10 x10E9/L Final    Uric Acid 01/19/2023 4.8  4.0 - 7.5 mg/dL Final    Comment:  Venipuncture immediately after or during the    administration of Metamizole may lead to falsely   low results. Testing should be performed immediately   prior to Metamizole dosing.     [facility] Encounter on 09/16/2022   Component Date Value Ref Range Status    WBC, Urine 09/16/2022 <1  0 - 5 /HPF Final    RBC, Urine 09/16/2022 1  0 - 5 /HPF Final    Squamous Epithelial Cells, Urine 09/16/2022 <1  /HPF Final    Color, Urine 09/16/2022 STRAW  STRAW,YELLOW Final    Appearance, Urine 09/16/2022 CLEAR  CLEAR Final    Specific Gravity, Urine 09/16/2022 1.020  1.005 - 1.035 Final    pH, Urine 09/16/2022 5.0  5.0 - 8.0 Final    Protein,  Urine 09/16/2022 NEGATIVE  NEGATIVE mg/dL Final    Glucose, Urine 09/16/2022 NEGATIVE  NEGATIVE mg/dL Final    Blood, Urine 09/16/2022 TRACE (A)  NEGATIVE Final    Ketones, Urine 09/16/2022 NEGATIVE  NEGATIVE mg/dL Final    Bilirubin, Urine 09/16/2022 NEGATIVE  NEGATIVE Final    Urobilinogen, Urine 09/16/2022 <2.0  0.0 - 1.9 mg/dL Final    Nitrite, Urine 09/16/2022 NEGATIVE  NEGATIVE Final    Leukocyte Esterase, Urine 09/16/2022 SMALL (1+) (A)  NEGATIVE Final    WBC 09/16/2022 7.5  4.4 - 11.3 x10E9/L Final    nRBC 09/16/2022 0.0  0.0 - 0.0 /100 WBC Final    RBC 09/16/2022 4.59  4.50 - 5.90 x10E12/L Final    Hemoglobin 09/16/2022 15.1  13.5 - 17.5 g/dL Final    Hematocrit 09/16/2022 45.7  41.0 - 52.0 % Final    MCV 09/16/2022 100  80 - 100 fL Final    MCHC 09/16/2022 33.0  32.0 - 36.0 g/dL Final    Platelets 09/16/2022 155  150 - 450 x10E9/L Final    RDW 09/16/2022 13.8  11.5 - 14.5 % Final    Neutrophils % 09/16/2022 45.3  40.0 - 80.0 % Final    Immature Granulocytes %, Automated 09/16/2022 0.4  0.0 - 0.9 % Final    Comment:  Immature Granulocyte Count (IG) includes promyelocytes,    myelocytes and metamyelocytes but does not include bands.   Percent differential counts (%) should be interpreted in the   context of the absolute cell counts (cells/L).      Lymphocytes % 09/16/2022 41.6  13.0 - 44.0 % Final    Monocytes % 09/16/2022 7.7  2.0 - 10.0 % Final    Eosinophils % 09/16/2022 4.5  0.0 - 6.0 % Final    Basophils % 09/16/2022 0.5  0.0 - 2.0 % Final    Neutrophils Absolute 09/16/2022 3.40  1.60 - 5.50 x10E9/L Final    Lymphocytes Absolute 09/16/2022 3.13 (H)  0.80 - 3.00 x10E9/L Final    Monocytes Absolute 09/16/2022 0.58  0.05 - 0.80 x10E9/L Final    Eosinophils Absolute 09/16/2022 0.34  0.00 - 0.40 x10E9/L Final    Basophils Absolute 09/16/2022 0.04  0.00 - 0.10 x10E9/L Final    Glucose 09/16/2022 104 (H)  74 - 99 mg/dL Final    Sodium 09/16/2022 141  136 - 145 mmol/L Final    Potassium 09/16/2022 4.4  3.5 -  5.3 mmol/L Final    Chloride 09/16/2022 107  98 - 107 mmol/L Final    Bicarbonate 09/16/2022 24  21 - 32 mmol/L Final    Anion Gap 09/16/2022 14  10 - 20 mmol/L Final    Urea Nitrogen 09/16/2022 26 (H)  6 - 23 mg/dL Final    Creatinine 09/16/2022 1.52 (H)  0.50 - 1.30 mg/dL Final    GFR MALE 09/16/2022 46 (A)  >90 mL/min/1.73m2 Final    Comment:  CALCULATIONS OF ESTIMATED GFR ARE PERFORMED   USING THE 2021 CKD-EPI STUDY REFIT EQUATION   WITHOUT THE RACE VARIABLE FOR THE IDMS-TRACEABLE   CREATININE METHODS.    https://jasn.asnjournals.org/content/early/2021/09/22/ASN.5166196955      Calcium 09/16/2022 9.9  8.6 - 10.6 mg/dL Final    Albumin 09/16/2022 4.4  3.4 - 5.0 g/dL Final    Alkaline Phosphatase 09/16/2022 62  33 - 136 U/L Final    Total Protein 09/16/2022 8.0  6.4 - 8.2 g/dL Final    AST 09/16/2022 18  9 - 39 U/L Final    Total Bilirubin 09/16/2022 0.5  0.0 - 1.2 mg/dL Final    ALT (SGPT) 09/16/2022 19  10 - 52 U/L Final    Comment:  Patients treated with Sulfasalazine may generate    falsely decreased results for ALT.       Current Outpatient Medications on File Prior to Visit   Medication Sig Dispense Refill    allopurinol (Zyloprim) 100 mg tablet Take 1 tablet (100 mg) by mouth once daily.      aspirin 81 mg EC tablet Take 1 tablet (81 mg) by mouth once daily.      busPIRone (Buspar) 15 mg tablet TAKE 1 TABLET BY MOUTH EVERY 12 HOURS 90 tablet 1    clopidogrel (Plavix) 75 mg tablet Take 1 tablet (75 mg) by mouth once daily. 90 tablet 1    gabapentin (Neurontin) 300 mg capsule TAKE 1 CAPSULE BY MOUTH IN THE MORNING and TAKE 2 capsules AT BEDTIME 180 capsule 1    losartan (Cozaar) 50 mg tablet Take 1 tablet (50 mg) by mouth once daily. as directed 90 tablet 0    mirtazapine (Remeron) 15 mg tablet Take 1 tablet (15 mg) by mouth once daily at bedtime. 90 tablet 0    simvastatin (Zocor) 20 mg tablet Take 1 tablet (20 mg) by mouth once daily. 90 tablet 1    triamcinolone (Kenalog) 0.1 % cream Apply topically 2  times a day. Apply to affected area 1-2 times daily as needed. 454 g 0     No current facility-administered medications on file prior to visit.     No images are attached to the encounter.            Assessment/Plan

## 2023-08-28 NOTE — PATIENT INSTRUCTIONS
Evaluating for lower extremity weakness.  Going to have you see neurology Dr. Tidwell.    Going to obtain a copy of the EMG and nerve conduction test were already performed.    Please continue use a walker or cane.    Reviewed EMG and nerve conduction testing consistent with polyneuropathy.  Reviewed MRI of the lumbar spine which shows degenerative changes but no diffuse disc bulge or mass was appreciated.    Referring to neurology for further evaluation

## 2023-08-31 ENCOUNTER — TELEPHONE (OUTPATIENT)
Dept: PRIMARY CARE | Facility: CLINIC | Age: 83
End: 2023-08-31
Payer: MEDICARE

## 2023-08-31 NOTE — TELEPHONE ENCOUNTER
called in because the neurologist you referred the patient to is requesting patient complete new patient stuff over the phone and maybe the internet? She does not feel comfortable giving any information over the phone especially social security number and would like for you or MA to do it?    [phone] Sarai Fang

## 2023-08-31 NOTE — TELEPHONE ENCOUNTER
Spoke to neurology office and they do not require ssn. Sarai made aware of it and will call the office for new patient registration.

## 2023-09-07 LAB
ANTI-SSA: 0.4 AI
ANTI-SSB: <0.2 AI
COBALAMIN (VITAMIN B12) (PG/ML) IN SER/PLAS: 237 PG/ML (ref 211–911)
PROTEIN TOTAL: 7.5 G/DL (ref 6.4–8.2)
RHEUMATOID FACTOR (IU/ML) IN SERUM OR PLASMA: 12 IU/ML (ref 0–15)
THYROTROPIN (MIU/L) IN SER/PLAS BY DETECTION LIMIT <= 0.05 MIU/L: 4.06 MIU/L (ref 0.44–3.98)

## 2023-09-08 LAB
ANA PATTERN: ABNORMAL
ANA TITER: ABNORMAL
ANTI-CENTROMERE: <0.2 AI
ANTI-CHROMATIN: <0.2 AI
ANTI-DNA (DS): <1 IU/ML
ANTI-JO-1 IGG: <0.2 AI
ANTI-NUCLEAR ANTIBODY (ANA): POSITIVE
ANTI-RIBOSOMAL P: <0.2 AI
ANTI-RNP: <0.2 AI
ANTI-SCL-70: <0.2 AI
ANTI-SM/RNP: <0.2 AI
ANTI-SM: <0.2 AI
ANTI-SSA: 0.4 AI
ANTI-SSB: <0.2 AI

## 2023-09-12 LAB
ALBUMIN ELP: 4 G/DL (ref 3.4–5)
ALPHA 1: 0.3 G/DL (ref 0.2–0.6)
ALPHA 2: 0.7 G/DL (ref 0.4–1.1)
BETA: 1 G/DL (ref 0.5–1.2)
GAMMA GLOBULIN: 1.5 G/DL (ref 0.5–1.4)
PATH REVIEW - SERUM IMMUNOFIXATION: NORMAL
PATH REVIEW-SERUM PROTEIN ELECTROPHORESIS: NORMAL
PROTEIN ELECTROPHORESIS INTERPRETATION: ABNORMAL
PROTEIN TOTAL: 7.5 G/DL (ref 6.4–8.2)
SERUM IMMUNOFIXATION INTERPRETATION: NORMAL

## 2023-09-18 DIAGNOSIS — R79.89 ELEVATED TSH: ICD-10-CM

## 2023-09-18 DIAGNOSIS — I10 BENIGN ESSENTIAL HYPERTENSION: ICD-10-CM

## 2023-09-27 DIAGNOSIS — G47.00 INSOMNIA, UNSPECIFIED TYPE: ICD-10-CM

## 2023-09-28 RX ORDER — BUSPIRONE HYDROCHLORIDE 15 MG/1
15 TABLET ORAL EVERY 12 HOURS
Qty: 90 TABLET | Refills: 1 | Status: SHIPPED | OUTPATIENT
Start: 2023-09-28 | End: 2024-04-22

## 2023-10-16 ENCOUNTER — LAB (OUTPATIENT)
Dept: LAB | Facility: LAB | Age: 83
End: 2023-10-16
Payer: MEDICARE

## 2023-10-16 DIAGNOSIS — I10 BENIGN ESSENTIAL HYPERTENSION: ICD-10-CM

## 2023-10-16 DIAGNOSIS — R79.89 ELEVATED TSH: ICD-10-CM

## 2023-10-16 PROCEDURE — 36415 COLL VENOUS BLD VENIPUNCTURE: CPT

## 2023-10-16 PROCEDURE — 84439 ASSAY OF FREE THYROXINE: CPT

## 2023-10-16 PROCEDURE — 83921 ORGANIC ACID SINGLE QUANT: CPT

## 2023-10-16 PROCEDURE — 84443 ASSAY THYROID STIM HORMONE: CPT

## 2023-10-16 PROCEDURE — 84481 FREE ASSAY (FT-3): CPT

## 2023-10-17 LAB
T3FREE SERPL-MCNC: 2.9 PG/ML (ref 2.3–4.2)
T4 FREE SERPL-MCNC: 1.01 NG/DL (ref 0.78–1.48)
TSH SERPL-ACNC: 3.5 MIU/L (ref 0.44–3.98)

## 2023-10-19 LAB — METHYLMALONATE SERPL-SCNC: 0.45 UMOL/L (ref 0–0.4)

## 2023-10-20 DIAGNOSIS — E53.8 VITAMIN B12 DEFICIENCY: ICD-10-CM

## 2023-10-27 DIAGNOSIS — I69.359 HEMIPARESIS AFFECTING DOMINANT SIDE AS LATE EFFECT OF CEREBROVASCULAR ACCIDENT (MULTI): ICD-10-CM

## 2023-10-27 DIAGNOSIS — G47.00 INSOMNIA, UNSPECIFIED TYPE: ICD-10-CM

## 2023-10-28 RX ORDER — CLOPIDOGREL BISULFATE 75 MG/1
75 TABLET ORAL DAILY
Qty: 90 TABLET | Refills: 1 | Status: SHIPPED | OUTPATIENT
Start: 2023-10-28 | End: 2024-04-22

## 2023-10-28 RX ORDER — MIRTAZAPINE 15 MG/1
15 TABLET, FILM COATED ORAL NIGHTLY
Qty: 90 TABLET | Refills: 0 | Status: SHIPPED | OUTPATIENT
Start: 2023-10-28 | End: 2023-11-22

## 2023-10-28 RX ORDER — SIMVASTATIN 20 MG/1
20 TABLET, FILM COATED ORAL DAILY
Qty: 90 TABLET | Refills: 1 | Status: SHIPPED | OUTPATIENT
Start: 2023-10-28 | End: 2024-04-22

## 2023-11-22 DIAGNOSIS — G47.00 INSOMNIA, UNSPECIFIED TYPE: ICD-10-CM

## 2023-11-22 RX ORDER — MIRTAZAPINE 15 MG/1
15 TABLET, FILM COATED ORAL NIGHTLY
Qty: 90 TABLET | Refills: 0 | Status: SHIPPED | OUTPATIENT
Start: 2023-11-22 | End: 2024-04-22

## 2024-02-21 ENCOUNTER — TELEPHONE (OUTPATIENT)
Dept: PRIMARY CARE | Facility: CLINIC | Age: 84
End: 2024-02-21
Payer: MEDICARE

## 2024-02-21 NOTE — TELEPHONE ENCOUNTER
Patient due for an MCRA at any point. Schedule with Alise or Dr. Troy. Please schedule patient and send this encounter to [facility] for lab orders.       Thank you-  Luis Graff CMA  2/21/2024  Practice Supervisor  [employer]

## 2024-03-10 DIAGNOSIS — K29.70 GASTRITIS, UNSPECIFIED, WITHOUT BLEEDING: ICD-10-CM

## 2024-03-11 RX ORDER — FAMOTIDINE 40 MG/1
40 TABLET, FILM COATED ORAL NIGHTLY
Qty: 30 TABLET | Refills: 3 | Status: SHIPPED | OUTPATIENT
Start: 2024-03-11

## 2024-04-21 DIAGNOSIS — G47.00 INSOMNIA, UNSPECIFIED TYPE: ICD-10-CM

## 2024-04-21 DIAGNOSIS — I10 BENIGN ESSENTIAL HYPERTENSION: ICD-10-CM

## 2024-04-21 DIAGNOSIS — I69.359 HEMIPARESIS AFFECTING DOMINANT SIDE AS LATE EFFECT OF CEREBROVASCULAR ACCIDENT (MULTI): ICD-10-CM

## 2024-04-22 RX ORDER — LOSARTAN POTASSIUM 50 MG/1
50 TABLET ORAL DAILY
Qty: 90 TABLET | Refills: 1 | Status: SHIPPED | OUTPATIENT
Start: 2024-04-22

## 2024-04-22 RX ORDER — CLOPIDOGREL BISULFATE 75 MG/1
75 TABLET ORAL DAILY
Qty: 90 TABLET | Refills: 1 | Status: SHIPPED | OUTPATIENT
Start: 2024-04-22

## 2024-04-22 RX ORDER — MIRTAZAPINE 15 MG/1
15 TABLET, FILM COATED ORAL NIGHTLY
Qty: 90 TABLET | Refills: 1 | Status: SHIPPED | OUTPATIENT
Start: 2024-04-22

## 2024-04-22 RX ORDER — BUSPIRONE HYDROCHLORIDE 15 MG/1
15 TABLET ORAL EVERY 12 HOURS
Qty: 180 TABLET | Refills: 1 | Status: SHIPPED | OUTPATIENT
Start: 2024-04-22

## 2024-04-22 RX ORDER — SIMVASTATIN 20 MG/1
20 TABLET, FILM COATED ORAL DAILY
Qty: 90 TABLET | Refills: 1 | Status: SHIPPED | OUTPATIENT
Start: 2024-04-22

## 2024-05-09 DIAGNOSIS — Z12.5 SCREENING PSA (PROSTATE SPECIFIC ANTIGEN): ICD-10-CM

## 2024-05-09 DIAGNOSIS — I10 BENIGN ESSENTIAL HYPERTENSION: ICD-10-CM

## 2024-06-07 ENCOUNTER — LAB (OUTPATIENT)
Dept: LAB | Facility: LAB | Age: 84
End: 2024-06-07
Payer: MEDICARE

## 2024-06-07 DIAGNOSIS — Z12.5 SCREENING PSA (PROSTATE SPECIFIC ANTIGEN): ICD-10-CM

## 2024-06-07 DIAGNOSIS — I10 BENIGN ESSENTIAL HYPERTENSION: ICD-10-CM

## 2024-06-07 DIAGNOSIS — D64.9 LOW HEMOGLOBIN: ICD-10-CM

## 2024-06-07 LAB
ALBUMIN SERPL BCP-MCNC: 3.8 G/DL (ref 3.4–5)
ALP SERPL-CCNC: 59 U/L (ref 33–136)
ALT SERPL W P-5'-P-CCNC: 13 U/L (ref 10–52)
ANION GAP SERPL CALC-SCNC: 14 MMOL/L (ref 10–20)
AST SERPL W P-5'-P-CCNC: 14 U/L (ref 9–39)
BASOPHILS # BLD AUTO: 0.04 X10*3/UL (ref 0–0.1)
BASOPHILS NFR BLD AUTO: 0.6 %
BILIRUB SERPL-MCNC: 0.5 MG/DL (ref 0–1.2)
BUN SERPL-MCNC: 22 MG/DL (ref 6–23)
CALCIUM SERPL-MCNC: 8.9 MG/DL (ref 8.6–10.6)
CHLORIDE SERPL-SCNC: 108 MMOL/L (ref 98–107)
CHOLEST SERPL-MCNC: 135 MG/DL (ref 0–199)
CHOLESTEROL/HDL RATIO: 3.9
CO2 SERPL-SCNC: 23 MMOL/L (ref 21–32)
CREAT SERPL-MCNC: 1.76 MG/DL (ref 0.5–1.3)
EGFRCR SERPLBLD CKD-EPI 2021: 38 ML/MIN/1.73M*2
EOSINOPHIL # BLD AUTO: 0.4 X10*3/UL (ref 0–0.4)
EOSINOPHIL NFR BLD AUTO: 5.9 %
ERYTHROCYTE [DISTWIDTH] IN BLOOD BY AUTOMATED COUNT: 14 % (ref 11.5–14.5)
GLUCOSE SERPL-MCNC: 101 MG/DL (ref 74–99)
HCT VFR BLD AUTO: 41.6 % (ref 41–52)
HDLC SERPL-MCNC: 34.2 MG/DL
HGB BLD-MCNC: 13.4 G/DL (ref 13.5–17.5)
IMM GRANULOCYTES # BLD AUTO: 0.05 X10*3/UL (ref 0–0.5)
IMM GRANULOCYTES NFR BLD AUTO: 0.7 % (ref 0–0.9)
LDLC SERPL CALC-MCNC: 55 MG/DL
LYMPHOCYTES # BLD AUTO: 2.66 X10*3/UL (ref 0.8–3)
LYMPHOCYTES NFR BLD AUTO: 39.2 %
MCH RBC QN AUTO: 33.3 PG (ref 26–34)
MCHC RBC AUTO-ENTMCNC: 32.2 G/DL (ref 32–36)
MCV RBC AUTO: 104 FL (ref 80–100)
MONOCYTES # BLD AUTO: 0.6 X10*3/UL (ref 0.05–0.8)
MONOCYTES NFR BLD AUTO: 8.8 %
NEUTROPHILS # BLD AUTO: 3.04 X10*3/UL (ref 1.6–5.5)
NEUTROPHILS NFR BLD AUTO: 44.8 %
NON HDL CHOLESTEROL: 101 MG/DL (ref 0–149)
NRBC BLD-RTO: 0 /100 WBCS (ref 0–0)
PLATELET # BLD AUTO: 176 X10*3/UL (ref 150–450)
POTASSIUM SERPL-SCNC: 4.8 MMOL/L (ref 3.5–5.3)
PROT SERPL-MCNC: 6.6 G/DL (ref 6.4–8.2)
PSA SERPL-MCNC: 3.97 NG/ML
RBC # BLD AUTO: 4.02 X10*6/UL (ref 4.5–5.9)
SODIUM SERPL-SCNC: 140 MMOL/L (ref 136–145)
TRIGL SERPL-MCNC: 229 MG/DL (ref 0–149)
TSH SERPL-ACNC: 2.93 MIU/L (ref 0.44–3.98)
VLDL: 46 MG/DL (ref 0–40)
WBC # BLD AUTO: 6.8 X10*3/UL (ref 4.4–11.3)

## 2024-06-07 PROCEDURE — G0103 PSA SCREENING: HCPCS

## 2024-06-07 PROCEDURE — 36415 COLL VENOUS BLD VENIPUNCTURE: CPT

## 2024-06-07 PROCEDURE — 82607 VITAMIN B-12: CPT

## 2024-06-07 PROCEDURE — 83540 ASSAY OF IRON: CPT

## 2024-06-07 PROCEDURE — 82728 ASSAY OF FERRITIN: CPT

## 2024-06-07 PROCEDURE — 80053 COMPREHEN METABOLIC PANEL: CPT

## 2024-06-07 PROCEDURE — 83550 IRON BINDING TEST: CPT

## 2024-06-07 PROCEDURE — 84443 ASSAY THYROID STIM HORMONE: CPT

## 2024-06-07 PROCEDURE — 80061 LIPID PANEL: CPT

## 2024-06-07 PROCEDURE — 85025 COMPLETE CBC W/AUTO DIFF WBC: CPT

## 2024-06-10 DIAGNOSIS — D64.9 LOW HEMOGLOBIN: ICD-10-CM

## 2024-06-10 DIAGNOSIS — E53.8 B12 DEFICIENCY: ICD-10-CM

## 2024-06-10 LAB
FERRITIN SERPL-MCNC: 332 NG/ML (ref 20–300)
IRON SATN MFR SERPL: 24 % (ref 25–45)
IRON SERPL-MCNC: 75 UG/DL (ref 35–150)
TIBC SERPL-MCNC: 317 UG/DL (ref 240–445)
UIBC SERPL-MCNC: 242 UG/DL (ref 110–370)
VIT B12 SERPL-MCNC: 277 PG/ML (ref 211–911)

## 2024-06-14 ENCOUNTER — LAB (OUTPATIENT)
Dept: LAB | Facility: LAB | Age: 84
End: 2024-06-14
Payer: MEDICARE

## 2024-06-14 ENCOUNTER — APPOINTMENT (OUTPATIENT)
Dept: PRIMARY CARE | Facility: CLINIC | Age: 84
End: 2024-06-14
Payer: MEDICARE

## 2024-06-14 VITALS
OXYGEN SATURATION: 95 % | HEART RATE: 81 BPM | DIASTOLIC BLOOD PRESSURE: 82 MMHG | BODY MASS INDEX: 35.31 KG/M2 | HEIGHT: 67 IN | TEMPERATURE: 97.6 F | SYSTOLIC BLOOD PRESSURE: 130 MMHG | WEIGHT: 225 LBS

## 2024-06-14 DIAGNOSIS — E53.8 B12 DEFICIENCY: ICD-10-CM

## 2024-06-14 DIAGNOSIS — E53.8 VITAMIN B12 DEFICIENCY: ICD-10-CM

## 2024-06-14 DIAGNOSIS — Z00.00 ENCOUNTER FOR ANNUAL WELLNESS VISIT (AWV) IN MEDICARE PATIENT: Primary | ICD-10-CM

## 2024-06-14 DIAGNOSIS — B02.29 POST HERPETIC NEURALGIA: ICD-10-CM

## 2024-06-14 DIAGNOSIS — I69.359 HEMIPARESIS AFFECTING DOMINANT SIDE AS LATE EFFECT OF CEREBROVASCULAR ACCIDENT (MULTI): ICD-10-CM

## 2024-06-14 DIAGNOSIS — E66.01 MORBID OBESITY (MULTI): ICD-10-CM

## 2024-06-14 DIAGNOSIS — Z71.89 CARDIAC RISK COUNSELING: ICD-10-CM

## 2024-06-14 DIAGNOSIS — R53.1 WEAKNESS: ICD-10-CM

## 2024-06-14 DIAGNOSIS — H35.033 HYPERTENSIVE RETINOPATHY, BILATERAL: ICD-10-CM

## 2024-06-14 DIAGNOSIS — N18.32 STAGE 3B CHRONIC KIDNEY DISEASE (MULTI): ICD-10-CM

## 2024-06-14 DIAGNOSIS — L30.9 DERMATITIS: ICD-10-CM

## 2024-06-14 DIAGNOSIS — N40.0 BENIGN PROSTATIC HYPERPLASIA WITHOUT LOWER URINARY TRACT SYMPTOMS: ICD-10-CM

## 2024-06-14 DIAGNOSIS — R97.20 PSA ELEVATION: ICD-10-CM

## 2024-06-14 DIAGNOSIS — Z71.89 ADVANCE DIRECTIVE DISCUSSED WITH PATIENT: ICD-10-CM

## 2024-06-14 DIAGNOSIS — D64.9 LOW HEMOGLOBIN: ICD-10-CM

## 2024-06-14 DIAGNOSIS — I10 BENIGN ESSENTIAL HYPERTENSION: ICD-10-CM

## 2024-06-14 PROBLEM — M79.605 LEFT LEG PAIN: Status: RESOLVED | Noted: 2023-04-20 | Resolved: 2024-06-14

## 2024-06-14 PROBLEM — M54.16 LEFT LUMBAR RADICULOPATHY: Status: RESOLVED | Noted: 2023-04-20 | Resolved: 2024-06-14

## 2024-06-14 PROBLEM — G62.9 PERIPHERAL NEUROPATHY: Status: RESOLVED | Noted: 2023-04-20 | Resolved: 2024-06-14

## 2024-06-14 PROBLEM — Z86.73 HISTORY OF COMPLETED STROKE: Status: RESOLVED | Noted: 2023-08-28 | Resolved: 2024-06-14

## 2024-06-14 PROBLEM — K62.5 RECTAL BLEEDING: Status: RESOLVED | Noted: 2023-05-08 | Resolved: 2024-06-14

## 2024-06-14 LAB
HGB RETIC QN: 37 PG (ref 28–38)
IMMATURE RETIC FRACTION: 11.8 %
RETICS #: 0.09 X10*6/UL (ref 0.02–0.11)
RETICS/RBC NFR AUTO: 2.3 % (ref 0.5–2)
VIT B12 SERPL-MCNC: 361 PG/ML (ref 211–911)

## 2024-06-14 PROCEDURE — 85045 AUTOMATED RETICULOCYTE COUNT: CPT

## 2024-06-14 PROCEDURE — 82607 VITAMIN B-12: CPT

## 2024-06-14 PROCEDURE — 83921 ORGANIC ACID SINGLE QUANT: CPT

## 2024-06-14 PROCEDURE — 36415 COLL VENOUS BLD VENIPUNCTURE: CPT

## 2024-06-14 RX ORDER — TRIAMCINOLONE ACETONIDE 1 MG/G
CREAM TOPICAL 2 TIMES DAILY
Qty: 45 G | Refills: 1 | Status: SHIPPED | OUTPATIENT
Start: 2024-06-14

## 2024-06-14 ASSESSMENT — ENCOUNTER SYMPTOMS
APPETITE CHANGE: 0
FACIAL ASYMMETRY: 0
DIFFICULTY URINATING: 0
CHILLS: 0
STRIDOR: 0
ARTHRALGIAS: 0
CHEST TIGHTNESS: 0
SINUS PAIN: 0
EYE REDNESS: 0
NECK PAIN: 0
FACIAL SWELLING: 0
POLYDIPSIA: 0
DEPRESSION: 0
DECREASED CONCENTRATION: 0
BACK PAIN: 0
GASTROINTESTINAL NEGATIVE: 1
HYPERACTIVE: 0
FATIGUE: 0
OCCASIONAL FEELINGS OF UNSTEADINESS: 1
FLANK PAIN: 0
ABDOMINAL PAIN: 0
EYE DISCHARGE: 0
SLEEP DISTURBANCE: 0
RHINORRHEA: 1
SINUS PRESSURE: 0
DYSURIA: 0
VOICE CHANGE: 0
LIGHT-HEADEDNESS: 0
ADENOPATHY: 0
WOUND: 0
COUGH: 0
BLOOD IN STOOL: 0
SEIZURES: 0
EYE ITCHING: 0
PALPITATIONS: 0
BRUISES/BLEEDS EASILY: 0
CONSTIPATION: 0
NUMBNESS: 1
RESPIRATORY NEGATIVE: 1
UNEXPECTED WEIGHT CHANGE: 0
AGITATION: 0
DIAPHORESIS: 0
FREQUENCY: 0
DIARRHEA: 0
SPEECH DIFFICULTY: 0
NERVOUS/ANXIOUS: 0
MYALGIAS: 0
TROUBLE SWALLOWING: 0
SORE THROAT: 0
SHORTNESS OF BREATH: 0
WEAKNESS: 1
LOSS OF SENSATION IN FEET: 0
EYE PAIN: 0
NECK STIFFNESS: 0
NAUSEA: 0
CARDIOVASCULAR NEGATIVE: 1
HEADACHES: 0
DIZZINESS: 0
HALLUCINATIONS: 0
ABDOMINAL DISTENTION: 0
ACTIVITY CHANGE: 0
ANAL BLEEDING: 0
VOMITING: 0

## 2024-06-14 ASSESSMENT — ACTIVITIES OF DAILY LIVING (ADL)
GROCERY_SHOPPING: NEEDS ASSISTANCE
DOING_HOUSEWORK: NEEDS ASSISTANCE
DRESSING: INDEPENDENT
MANAGING_FINANCES: NEEDS ASSISTANCE
BATHING: INDEPENDENT
TAKING_MEDICATION: NEEDS ASSISTANCE

## 2024-06-14 ASSESSMENT — PATIENT HEALTH QUESTIONNAIRE - PHQ9
1. LITTLE INTEREST OR PLEASURE IN DOING THINGS: NOT AT ALL
2. FEELING DOWN, DEPRESSED OR HOPELESS: NOT AT ALL
10. IF YOU CHECKED OFF ANY PROBLEMS, HOW DIFFICULT HAVE THESE PROBLEMS MADE IT FOR YOU TO DO YOUR WORK, TAKE CARE OF THINGS AT HOME, OR GET ALONG WITH OTHER PEOPLE: NOT DIFFICULT AT ALL
SUM OF ALL RESPONSES TO PHQ9 QUESTIONS 1 AND 2: 0

## 2024-06-14 NOTE — ASSESSMENT & PLAN NOTE
PSA level has elevated by 3 points since last check and we are going to reevaluate and do PSA level in 6 months

## 2024-06-14 NOTE — PATIENT INSTRUCTIONS
Overall stable.    Going to continue with present regimen.  Would like to see demonstrated weight loss.    Recommend staying physically active.    Medications reviewed    Labs reviewed with you today PSA level is elevated compared to the last PSA return to recheck this in 6 months EKG performed and reviewed

## 2024-06-14 NOTE — PROGRESS NOTES
The ASCVD Risk score (Kourtney PINEDA, et al., 2019) failed to calculate for the following reasons:    The 2019 ASCVD risk score is only valid for ages 40 to 79  Time spent was 10 mins reviewing  Advance Care Planning Note     Discussion Date: 06/14/24   Discussion Participants: patient    The patient wishes to discuss Advance Care Planning today and the following is a brief summary of our discussion.     Patient has capacity to make their own medical decisions: Yes  Health Care Agent/Surrogate Decision Maker documented in chart: Yes    Documents on file and valid:  Advance Directive/Living Will: No   Health Care Power of : No  Other: discussed and code status updated    Communication of Medical Status/Prognosis:   good    Communication of Treatment Goals/Options:   good    Treatment Decisions  Goals of Care: survival is prioritized, if goals for quality or survival can reasonably be achieved   agree  Follow Up Plan  Discuss next year  Team Members  PCP  Time Statement: Total face to face time spent on advance care planning was 16 minutes with 16 minutes spent in counseling, including the explanation.    Tiago Troy, [employer]   Patient ID: Melchor Fang is a 84 y.o. male who presents for Medicare Annual Wellness Visit Subsequent.    Patient slowing down.  Dates of both his legs are somewhat weakened.  He had postherpetic neuralgia in the left leg and has had a stroke which has affected the right leg he is using a cane to get around he still is gardening but really has cut back on has had no trouble with headaches or double vision or blurry vision no troubles with sore throat or difficulty swallowing he is drinking approximately 2 beers a day and does not want to quit he is aware of the benefits and risks associated with this but does not want to quit he had no nausea no vomiting no abdominal pain or discomfort.         Alcohol intake: 14 beers  Caffeine intake: 1 coffee in am  Exercise: gardening    Last  Colonoscopy: n/a  Last Pap smear: N/A  Mammogram:n/A  Last Dexa scan:N/A    Shingles vaccine: refused  TdaP vaccine:     Review of Systems   Constitutional:  Negative for activity change, appetite change, chills, diaphoresis, fatigue and unexpected weight change.   HENT:  Positive for rhinorrhea. Negative for congestion, dental problem, ear discharge, ear pain, facial swelling, hearing loss, mouth sores, nosebleeds, postnasal drip, sinus pressure, sinus pain, sore throat, tinnitus, trouble swallowing and voice change.         Fluid R ear   Eyes:  Negative for pain, discharge, redness, itching and visual disturbance.        Had  retinopathy   Respiratory: Negative.  Negative for cough, chest tightness, shortness of breath and stridor.    Cardiovascular: Negative.  Negative for chest pain, palpitations and leg swelling.   Gastrointestinal: Negative.  Negative for abdominal distention, abdominal pain, anal bleeding, blood in stool, constipation, diarrhea, nausea and vomiting.   Endocrine: Negative for cold intolerance, heat intolerance, polydipsia and polyuria.   Genitourinary:  Negative for difficulty urinating, dysuria, enuresis (once at night), flank pain, frequency, scrotal swelling and testicular pain.   Musculoskeletal:  Negative for arthralgias, back pain, myalgias, neck pain and neck stiffness.   Skin:  Negative for rash and wound.   Allergic/Immunologic: Negative for environmental allergies, food allergies and immunocompromised state.   Neurological:  Positive for weakness and numbness. Negative for dizziness, seizures, facial asymmetry, speech difficulty, light-headedness and headaches.   Hematological:  Negative for adenopathy. Does not bruise/bleed easily.   Psychiatric/Behavioral:  Negative for agitation, behavioral problems, decreased concentration, hallucinations, sleep disturbance and suicidal ideas. The patient is not nervous/anxious and is not hyperactive.        Objective   /82   Pulse 81    "Temp 36.4 °C (97.6 °F)   Ht 1.689 m (5' 6.5\")   Wt 102 kg (225 lb)   SpO2 95%   BMI 35.77 kg/m²   BSA Body surface area is 2.19 meters squared.      Physical Exam  Constitutional:       General: He is not in acute distress.     Appearance: Normal appearance. He is obese. He is not ill-appearing or toxic-appearing.   HENT:      Head: Normocephalic.      Right Ear: Tympanic membrane normal.      Left Ear: Tympanic membrane normal.      Nose: Nose normal.      Mouth/Throat:      Mouth: Mucous membranes are moist.   Eyes:      Extraocular Movements: Extraocular movements intact.      Conjunctiva/sclera: Conjunctivae normal.      Pupils: Pupils are equal, round, and reactive to light.   Cardiovascular:      Rate and Rhythm: Normal rate and regular rhythm.      Pulses: Normal pulses.      Heart sounds: Normal heart sounds.   Pulmonary:      Effort: Pulmonary effort is normal. No respiratory distress.      Breath sounds: Normal breath sounds. No wheezing or rhonchi.   Abdominal:      General: Abdomen is flat. Bowel sounds are normal. There is no distension.      Palpations: Abdomen is soft.      Tenderness: There is no abdominal tenderness. There is no right CVA tenderness or rebound.      Hernia: No hernia is present.   Genitourinary:     Penis: Normal.       Testes: Normal.      Comments: No significant enlargement of prostate noted no nodularity  Musculoskeletal:         General: Normal range of motion.      Cervical back: Normal range of motion.      Right lower leg: No edema.   Skin:     General: Skin is warm and dry.      Capillary Refill: Capillary refill takes less than 2 seconds. Patient still having generalized weakness of the lower extremities bilaterally.  Cranial nerves II through XII otherwise intact.    Strong handgrip strength noted     Findings: No bruising.   Neurological:      Mental Status: He is alert and oriented to person, place, and time. Mental status is at baseline.      Cranial Nerves: No " cranial nerve deficit.      Sensory: No sensory deficit.      Motor: No weakness.      Coordination: Coordination normal.      Gait: Gait normal.      Deep Tendon Reflexes: Reflexes normal.      Comments: Some shuffling of the gait noted.    Handgrip appears to be strong bilaterally.  Slight incoordination of right hand.    The right leg reveals good range of motion.  Left leg reveals good range of motion.  Deep tendon reflexes are equal and strong bilaterally.    Finger-nose testing intact as well.   Psychiatric:         Mood and Affect: Mood normal.         Behavior: Behavior normal.         Thought Content: Thought content normal.       Lab on 06/07/2024   Component Date Value Ref Range Status    WBC 06/07/2024 6.8  4.4 - 11.3 x10*3/uL Final    nRBC 06/07/2024 0.0  0.0 - 0.0 /100 WBCs Final    RBC 06/07/2024 4.02 (L)  4.50 - 5.90 x10*6/uL Final    Hemoglobin 06/07/2024 13.4 (L)  13.5 - 17.5 g/dL Final    Hematocrit 06/07/2024 41.6  41.0 - 52.0 % Final    MCV 06/07/2024 104 (H)  80 - 100 fL Final    MCH 06/07/2024 33.3  26.0 - 34.0 pg Final    MCHC 06/07/2024 32.2  32.0 - 36.0 g/dL Final    RDW 06/07/2024 14.0  11.5 - 14.5 % Final    Platelets 06/07/2024 176  150 - 450 x10*3/uL Final    Neutrophils % 06/07/2024 44.8  40.0 - 80.0 % Final    Immature Granulocytes %, Automated 06/07/2024 0.7  0.0 - 0.9 % Final    Immature Granulocyte Count (IG) includes promyelocytes, myelocytes and metamyelocytes but does not include bands. Percent differential counts (%) should be interpreted in the context of the absolute cell counts (cells/UL).    Lymphocytes % 06/07/2024 39.2  13.0 - 44.0 % Final    Monocytes % 06/07/2024 8.8  2.0 - 10.0 % Final    Eosinophils % 06/07/2024 5.9  0.0 - 6.0 % Final    Basophils % 06/07/2024 0.6  0.0 - 2.0 % Final    Neutrophils Absolute 06/07/2024 3.04  1.60 - 5.50 x10*3/uL Final    Percent differential counts (%) should be interpreted in the context of the absolute cell counts (cells/uL).     Immature Granulocytes Absolute, Au* 06/07/2024 0.05  0.00 - 0.50 x10*3/uL Final    Lymphocytes Absolute 06/07/2024 2.66  0.80 - 3.00 x10*3/uL Final    Monocytes Absolute 06/07/2024 0.60  0.05 - 0.80 x10*3/uL Final    Eosinophils Absolute 06/07/2024 0.40  0.00 - 0.40 x10*3/uL Final    Basophils Absolute 06/07/2024 0.04  0.00 - 0.10 x10*3/uL Final    Glucose 06/07/2024 101 (H)  74 - 99 mg/dL Final    Sodium 06/07/2024 140  136 - 145 mmol/L Final    Potassium 06/07/2024 4.8  3.5 - 5.3 mmol/L Final    Chloride 06/07/2024 108 (H)  98 - 107 mmol/L Final    Bicarbonate 06/07/2024 23  21 - 32 mmol/L Final    Anion Gap 06/07/2024 14  10 - 20 mmol/L Final    Urea Nitrogen 06/07/2024 22  6 - 23 mg/dL Final    Creatinine 06/07/2024 1.76 (H)  0.50 - 1.30 mg/dL Final    eGFR 06/07/2024 38 (L)  >60 mL/min/1.73m*2 Final    Calculations of estimated GFR are performed using the 2021 CKD-EPI Study Refit equation without the race variable for the IDMS-Traceable creatinine methods.  https://jasn.asnjournals.org/content/early/2021/09/22/ASN.9115197689    Calcium 06/07/2024 8.9  8.6 - 10.6 mg/dL Final    Albumin 06/07/2024 3.8  3.4 - 5.0 g/dL Final    Alkaline Phosphatase 06/07/2024 59  33 - 136 U/L Final    Total Protein 06/07/2024 6.6  6.4 - 8.2 g/dL Final    AST 06/07/2024 14  9 - 39 U/L Final    Bilirubin, Total 06/07/2024 0.5  0.0 - 1.2 mg/dL Final    ALT 06/07/2024 13  10 - 52 U/L Final    Patients treated with Sulfasalazine may generate falsely decreased results for ALT.    Cholesterol 06/07/2024 135  0 - 199 mg/dL Final          Age      Desirable   Borderline High   High     0-19 Y     0 - 169       170 - 199     >/= 200    20-24 Y     0 - 189       190 - 224     >/= 225         >24 Y     0 - 199       200 - 239     >/= 240   **All ranges are based on fasting samples. Specific   therapeutic targets will vary based on patient-specific   cardiac risk.    Pediatric guidelines reference:Pediatrics 2011, 128(S5).Adult guidelines  reference: NCEP ATPIII Guidelines,HERB 2001, 258:2486-97    Venipuncture immediately after or during the administration of Metamizole may lead to falsely low results. Testing should be performed immediately prior to Metamizole dosing.    HDL-Cholesterol 06/07/2024 34.2  mg/dL Final      Age       Very Low   Low     Normal    High    0-19 Y    < 35      < 40     40-45     ----  20-24 Y    ----     < 40      >45      ----        >24 Y      ----     < 40     40-60      >60      Cholesterol/HDL Ratio 06/07/2024 3.9   Final      Ref Values  Desirable  < 3.4  High Risk  > 5.0    LDL Calculated 06/07/2024 55  <=99 mg/dL Final                                Near   Borderline      AGE      Desirable  Optimal    High     High     Very High     0-19 Y     0 - 109     ---    110-129   >/= 130     ----    20-24 Y     0 - 119     ---    120-159   >/= 160     ----      >24 Y     0 -  99   100-129  130-159   160-189     >/=190      VLDL 06/07/2024 46 (H)  0 - 40 mg/dL Final    Triglycerides 06/07/2024 229 (H)  0 - 149 mg/dL Final       Age         Desirable   Borderline High   High     Very High   0 D-90 D    19 - 174         ----         ----        ----  91 D- 9 Y     0 -  74        75 -  99     >/= 100      ----    10-19 Y     0 -  89        90 - 129     >/= 130      ----    20-24 Y     0 - 114       115 - 149     >/= 150      ----         >24 Y     0 - 149       150 - 199    200- 499    >/= 500    Venipuncture immediately after or during the administration of Metamizole may lead to falsely low results. Testing should be performed immediately prior to Metamizole dosing.    Non HDL Cholesterol 06/07/2024 101  0 - 149 mg/dL Final          Age       Desirable   Borderline High   High     Very High     0-19 Y     0 - 119       120 - 144     >/= 145    >/= 160    20-24 Y     0 - 149       150 - 189     >/= 190      ----         >24 Y    30 mg/dL above LDL Cholesterol goal      Thyroid Stimulating Hormone 06/07/2024 2.93  0.44 - 3.98  mIU/L Final    Prostate Specific Antigen,Screen 06/07/2024 3.97  <=4.00 ng/mL Final    Iron 06/07/2024 75  35 - 150 ug/dL Final    UIBC 06/07/2024 242  110 - 370 ug/dL Final    TIBC 06/07/2024 317  240 - 445 ug/dL Final    % Saturation 06/07/2024 24 (L)  25 - 45 % Final    Ferritin 06/07/2024 332 (H)  20 - 300 ng/mL Final    Vitamin B12 06/07/2024 277  211 - 911 pg/mL Final   Lab on 10/16/2023   Component Date Value Ref Range Status    Thyroid Stimulating Hormone 10/16/2023 3.50  0.44 - 3.98 mIU/L Final    Triiodothyronine, Free 10/16/2023 2.9  2.3 - 4.2 pg/mL Final    Thyroxine, Free 10/16/2023 1.01  0.78 - 1.48 ng/dL Final    Methylmalonic Acid, S 10/16/2023 0.45 (H)  0.00 - 0.40 umol/L Final      Slight elevation 0.41-0.99 umol/L         Consistent with mild vitamin B12 deficiency, renal         insufficiency, or intravascular volume contraction.    Moderate elevation 1.00-9.99 umol/L          Consistent with mild vitamin B12 deficiency.    Massive elevation - Greater than or equal to 10 umol/L          Consistent with significant vitamin B12 deficiency          or with inborn errors of metabolism.  INTERPRETIVE INFORMATION: MMA Serum/Plasma,                             Vitamin B12 Status    This test was developed and its performance characteristics   determined by [facility]. It has not been cleared or   approved by the US Food and Drug Administration. This test was   performed in a CLIA certified laboratory and is intended for   clinical purposes.  Performed By: [facility]  [address] [place] [id number]  : Micky Alexis MD, PhD  CLIA Number: [id number]   Orders Only on 09/07/2023   Component Date Value Ref Range Status    TSH 09/07/2023 4.06 (H)  0.44 - 3.98 mIU/L Final    Comment:  TSH testing is performed using different testing    methodology at [facility] than at other    [facility]. Direct result comparisons should    only be made  within the same method.      Rheumatoid Factor 09/07/2023 12  0 - 15 IU/mL Final    Vitamin B-12 09/07/2023 237  211 - 911 pg/mL Final    Total Protein 09/07/2023 7.5  6.4 - 8.2 g/dL Final    Anti-SSA 09/07/2023 0.4  AI Final    Comment: REF VALUES   < 1.0 = NEGATIVE   >=1.0 = POSITIVE      ANTI-NUCLEAR ANTIBODY (JUAN) 09/07/2023 POSITIVE (A)  NEGATIVE Final    Comment:   The Antinuclear Antibody (JUAN) test was performed using    indirect immunofluorescence assay with HEp-2 cells slide.      JUAN Titer 09/07/2023 1:320  <1:80 Final    JUAN Pattern 09/07/2023 SPECKLED   Final    Anti-SSB 09/07/2023 <0.2  AI Final    Comment: REF VALUES   < 1.0 = NEGATIVE   >=1.0 = POSITIVE      Total Protein 09/07/2023 7.5  6.4 - 8.2 g/dL Final    Albumin 09/07/2023 4.0  3.4 - 5.0 g/dL Final    Alpha 1 09/07/2023 0.3  0.2 - 0.6 g/dL Final    Alpha 2 09/07/2023 0.7  0.4 - 1.1 g/dL Final    Beta 09/07/2023 1.0  0.5 - 1.2 g/dL Final    Gamma 09/07/2023 1.5 (H)  0.5 - 1.4 g/dL Final    SPE Interpretation 09/07/2023 ABNORMAL   Final    Increase in polyclonal gamma globulins.    Serum Immunofixation Interpretation 09/07/2023 NORMAL   Final    No monoclonal proteins detected by immunofixation.    Path Review - Serum Protein Electr* 09/07/2023 SAURAV   Final    Comment:  By her/his signature above, the Pathologist   listed as making the final interpretation   certifies that she/he has personally reviewed    this case.  ----------------------------------------------       Path Review - Serum Immunofixation 09/07/2023 SAURAV   Final    Comment:  By her/his signature above, the Pathologist   listed as making the final interpretation   certifies that she/he has personally reviewed    this case.  ----------------------------------------------       Anti-SM 09/07/2023 <0.2  AI Final    Comment: REF VALUES   < 1.0 = NEGATIVE   >=1.0 = POSITIVE      Anti-RNP 09/07/2023 <0.2  AI Final    Comment: REF VALUES   < 1.0 = NEGATIVE   >=1.0 =  POSITIVE      Anti-SM/RNP 09/07/2023 <0.2  AI Final    Comment: REF VALUES   < 1.0 = NEGATIVE   >=1.0 = POSITIVE      Anti-SSA 09/07/2023 0.4  AI Final    Comment: REF VALUES   < 1.0 = NEGATIVE   >=1.0 = POSITIVE      Anti-SSB 09/07/2023 <0.2  AI Final    Comment: REF VALUES   < 1.0 = NEGATIVE   >=1.0 = POSITIVE      Anti-SCL-70 09/07/2023 <0.2  AI Final    Comment: REF VALUES   < 1.0 = NEGATIVE   >=1.0 = POSITIVE      Anti-JANAY-1 IgG 09/07/2023 <0.2  AI Final    Comment: REF VALUES   < 1.0 = NEGATIVE   >=1.0 = POSITIVE      Anti-Chromatin 09/07/2023 <0.2  AI Final    Comment: REF VALUES   < 1.0 = NEGATIVE   >=1.0 = POSITIVE      Anti-Centromere 09/07/2023 <0.2  AI Final    Comment: REF VALUES   < 1.0 = NEGATIVE   >=1.0 = POSITIVE      Anti-Ribosomal P 09/07/2023 <0.2  AI Final    Comment: REF VALUES   < 1.0 = NEGATIVE   >=1.0 = POSITIVE      Anti-DNA (DS) 09/07/2023 <1.0  IU/mL Final    Comment: REF VALUES  NEGATIVE:    <= 4 IU/ML  EQUIVOCAL:   5- 9 IU/ML  POSITIVE:    >=10 IU/ML     Lab on 08/21/2023   Component Date Value Ref Range Status    TSH 08/21/2023 3.86  0.44 - 3.98 mIU/L Final     TSH testing is performed using different testing    methodology at [facility] than at other    [facility]. Direct result comparisons should    only be made within the same method.     Current Outpatient Medications on File Prior to Visit   Medication Sig Dispense Refill    allopurinol (Zyloprim) 100 mg tablet Take 1 tablet (100 mg) by mouth once daily.      aspirin 81 mg EC tablet Take 1 tablet (81 mg) by mouth once daily.      busPIRone (Buspar) 15 mg tablet TAKE 1 TABLET BY MOUTH EVERY 12 HOURS 180 tablet 1    clopidogrel (Plavix) 75 mg tablet TAKE 1 TABLET BY MOUTH ONCE DAILY 90 tablet 1    famotidine (Pepcid) 40 mg tablet TAKE 1 TABLET BY MOUTH AT BEDTIME 30 tablet 3    gabapentin (Neurontin) 300 mg capsule TAKE 1 CAPSULE BY MOUTH IN THE MORNING and TAKE 2 capsules AT BEDTIME 180 capsule 1    losartan  (Cozaar) 50 mg tablet TAKE 1 TABLET BY MOUTH ONCE DAILY AS DIRECTED 90 tablet 1    mirtazapine (Remeron) 15 mg tablet TAKE 1 TABLET BY MOUTH AT BEDTIME 90 tablet 1    simvastatin (Zocor) 20 mg tablet TAKE 1 TABLET BY MOUTH ONCE DAILY 90 tablet 1    triamcinolone (Kenalog) 0.1 % cream Apply topically 2 times a day. Apply to affected area 1-2 times daily as needed. 454 g 0     No current facility-administered medications on file prior to visit.     No images are attached to the encounter.            Assessment/Plan   Problem List Items Addressed This Visit             ICD-10-CM    Morbid obesity (Multi) E66.01     Would like patient to do Mediterranean diet         Stage 3b chronic kidney disease (Multi) N18.32     Stable, continue to follow kidney function         Benign essential hypertension I10     Blood pressure well-controlled         Benign prostatic hyperplasia without lower urinary tract symptoms N40.0    Hypertensive retinopathy, bilateral H35.033    Vitamin B12 deficiency E53.8    Encounter for annual wellness visit (AWV) in Medicare patient - Primary Z00.00    Dermatitis L30.9     Doing well continue with steroid cream as needed         Weakness R53.1     Stable, no significant changes noted         Post herpetic neuralgia B02.29     Has been evaluated by neurology this has been stable on the left side         Hemiparesis affecting dominant side as late effect of cerebrovascular accident (Multi) I69.359     Has been stable.  No changes         PSA elevation R97.20     PSA level has elevated by 3 points since last check and we are going to reevaluate and do PSA level in 6 months

## 2024-06-18 LAB — METHYLMALONATE SERPL-SCNC: 0.28 UMOL/L (ref 0–0.4)

## 2024-10-13 DIAGNOSIS — G47.00 INSOMNIA, UNSPECIFIED TYPE: ICD-10-CM

## 2024-10-13 DIAGNOSIS — I69.359 HEMIPARESIS AFFECTING DOMINANT SIDE AS LATE EFFECT OF CEREBROVASCULAR ACCIDENT (MULTI): ICD-10-CM

## 2024-10-13 DIAGNOSIS — I10 BENIGN ESSENTIAL HYPERTENSION: ICD-10-CM

## 2024-10-14 RX ORDER — LOSARTAN POTASSIUM 50 MG/1
50 TABLET ORAL DAILY
Qty: 90 TABLET | Refills: 1 | Status: SHIPPED | OUTPATIENT
Start: 2024-10-14

## 2024-10-14 RX ORDER — CLOPIDOGREL BISULFATE 75 MG/1
75 TABLET ORAL DAILY
Qty: 90 TABLET | Refills: 1 | Status: SHIPPED | OUTPATIENT
Start: 2024-10-14

## 2024-10-14 RX ORDER — BUSPIRONE HYDROCHLORIDE 15 MG/1
15 TABLET ORAL EVERY 12 HOURS
Qty: 180 TABLET | Refills: 1 | Status: SHIPPED | OUTPATIENT
Start: 2024-10-14

## 2024-10-14 RX ORDER — MIRTAZAPINE 15 MG/1
15 TABLET, FILM COATED ORAL NIGHTLY
Qty: 90 TABLET | Refills: 1 | Status: SHIPPED | OUTPATIENT
Start: 2024-10-14

## 2024-10-14 RX ORDER — SIMVASTATIN 20 MG/1
20 TABLET, FILM COATED ORAL DAILY
Qty: 90 TABLET | Refills: 1 | Status: SHIPPED | OUTPATIENT
Start: 2024-10-14

## 2024-11-07 DIAGNOSIS — L30.9 DERMATITIS: ICD-10-CM

## 2024-11-07 RX ORDER — TRIAMCINOLONE ACETONIDE 1 MG/G
CREAM TOPICAL 2 TIMES DAILY
Qty: 454 G | Refills: 1 | Status: SHIPPED | OUTPATIENT
Start: 2024-11-07

## 2025-03-21 DIAGNOSIS — Z12.5 SCREENING PSA (PROSTATE SPECIFIC ANTIGEN): ICD-10-CM

## 2025-03-21 DIAGNOSIS — I10 BENIGN ESSENTIAL HYPERTENSION: ICD-10-CM

## 2025-04-13 DIAGNOSIS — I10 BENIGN ESSENTIAL HYPERTENSION: ICD-10-CM

## 2025-04-13 DIAGNOSIS — I69.359 HEMIPARESIS AFFECTING DOMINANT SIDE AS LATE EFFECT OF CEREBROVASCULAR ACCIDENT (MULTI): ICD-10-CM

## 2025-04-13 DIAGNOSIS — G47.00 INSOMNIA, UNSPECIFIED TYPE: ICD-10-CM

## 2025-04-14 RX ORDER — SIMVASTATIN 20 MG/1
20 TABLET, FILM COATED ORAL DAILY
Qty: 90 TABLET | Refills: 1 | Status: SHIPPED | OUTPATIENT
Start: 2025-04-14

## 2025-04-14 RX ORDER — MIRTAZAPINE 15 MG/1
15 TABLET, FILM COATED ORAL NIGHTLY
Qty: 90 TABLET | Refills: 1 | Status: SHIPPED | OUTPATIENT
Start: 2025-04-14

## 2025-04-14 RX ORDER — BUSPIRONE HYDROCHLORIDE 15 MG/1
15 TABLET ORAL EVERY 12 HOURS
Qty: 180 TABLET | Refills: 1 | Status: SHIPPED | OUTPATIENT
Start: 2025-04-14

## 2025-04-14 RX ORDER — LOSARTAN POTASSIUM 50 MG/1
50 TABLET ORAL DAILY
Qty: 90 TABLET | Refills: 1 | Status: SHIPPED | OUTPATIENT
Start: 2025-04-14

## 2025-04-14 RX ORDER — CLOPIDOGREL BISULFATE 75 MG/1
75 TABLET ORAL DAILY
Qty: 90 TABLET | Refills: 1 | Status: SHIPPED | OUTPATIENT
Start: 2025-04-14

## 2025-04-23 ENCOUNTER — TELEPHONE (OUTPATIENT)
Dept: PHARMACY | Facility: HOSPITAL | Age: 85
End: 2025-04-23
Payer: MEDICARE

## 2025-04-23 NOTE — TELEPHONE ENCOUNTER
Population Health: Outreach by Ambulatory Pharmacy Team    Patient: Melchor EAGLE Claudiobenigno  Primary Care Provider (PCP): @PCP@  Payor: [employer]ASS[employer]  Reason: Adherence  Medication(s): losartan 50mg and simvastatin 20mg  Outcome: Left Voicemail    Lynne Connor [facility]   Pharmacy Resident

## 2025-06-10 LAB
ALBUMIN SERPL-MCNC: 4.3 G/DL (ref 3.6–5.1)
ALP SERPL-CCNC: 50 U/L (ref 35–144)
ALT SERPL-CCNC: 14 U/L (ref 9–46)
ANION GAP SERPL CALCULATED.4IONS-SCNC: 9 MMOL/L (CALC) (ref 7–17)
AST SERPL-CCNC: 15 U/L (ref 10–35)
BASOPHILS # BLD AUTO: 59 CELLS/UL (ref 0–200)
BASOPHILS NFR BLD AUTO: 1 %
BILIRUB SERPL-MCNC: 0.7 MG/DL (ref 0.2–1.2)
BUN SERPL-MCNC: 24 MG/DL (ref 7–25)
CALCIUM SERPL-MCNC: 9.4 MG/DL (ref 8.6–10.3)
CHLORIDE SERPL-SCNC: 105 MMOL/L (ref 98–110)
CHOLEST SERPL-MCNC: 138 MG/DL
CHOLEST/HDLC SERPL: 3.6 (CALC)
CO2 SERPL-SCNC: 25 MMOL/L (ref 20–32)
CREAT SERPL-MCNC: 1.83 MG/DL (ref 0.7–1.22)
EGFRCR SERPLBLD CKD-EPI 2021: 36 ML/MIN/1.73M2
EOSINOPHIL # BLD AUTO: 372 CELLS/UL (ref 15–500)
EOSINOPHIL NFR BLD AUTO: 6.3 %
ERYTHROCYTE [DISTWIDTH] IN BLOOD BY AUTOMATED COUNT: 13.1 % (ref 11–15)
GLUCOSE SERPL-MCNC: 100 MG/DL (ref 65–99)
HCT VFR BLD AUTO: 45.3 % (ref 38.5–50)
HDLC SERPL-MCNC: 38 MG/DL
HGB BLD-MCNC: 15 G/DL (ref 13.2–17.1)
LDLC SERPL CALC-MCNC: 70 MG/DL (CALC)
LYMPHOCYTES # BLD AUTO: 2419 CELLS/UL (ref 850–3900)
LYMPHOCYTES NFR BLD AUTO: 41 %
MCH RBC QN AUTO: 33.6 PG (ref 27–33)
MCHC RBC AUTO-ENTMCNC: 33.1 G/DL (ref 32–36)
MCV RBC AUTO: 101.6 FL (ref 80–100)
MONOCYTES # BLD AUTO: 496 CELLS/UL (ref 200–950)
MONOCYTES NFR BLD AUTO: 8.4 %
NEUTROPHILS # BLD AUTO: 2555 CELLS/UL (ref 1500–7800)
NEUTROPHILS NFR BLD AUTO: 43.3 %
NONHDLC SERPL-MCNC: 100 MG/DL (CALC)
PLATELET # BLD AUTO: 164 THOUSAND/UL (ref 140–400)
PMV BLD REES-ECKER: 10.4 FL (ref 7.5–12.5)
POTASSIUM SERPL-SCNC: 4.9 MMOL/L (ref 3.5–5.3)
PROT SERPL-MCNC: 7.7 G/DL (ref 6.1–8.1)
PSA SERPL-MCNC: 0.76 NG/ML
RBC # BLD AUTO: 4.46 MILLION/UL (ref 4.2–5.8)
SODIUM SERPL-SCNC: 139 MMOL/L (ref 135–146)
TRIGL SERPL-MCNC: 209 MG/DL
TSH SERPL-ACNC: 3.84 MIU/L (ref 0.4–4.5)
WBC # BLD AUTO: 5.9 THOUSAND/UL (ref 3.8–10.8)

## 2025-06-16 ENCOUNTER — APPOINTMENT (OUTPATIENT)
Dept: PRIMARY CARE | Facility: CLINIC | Age: 85
End: 2025-06-16
Payer: MEDICARE

## 2025-06-16 VITALS
OXYGEN SATURATION: 96 % | HEIGHT: 67 IN | SYSTOLIC BLOOD PRESSURE: 126 MMHG | BODY MASS INDEX: 35.31 KG/M2 | DIASTOLIC BLOOD PRESSURE: 80 MMHG | TEMPERATURE: 98.6 F | WEIGHT: 225 LBS | HEART RATE: 81 BPM

## 2025-06-16 DIAGNOSIS — Z00.00 ENCOUNTER FOR ANNUAL WELLNESS VISIT (AWV) IN MEDICARE PATIENT: Primary | ICD-10-CM

## 2025-06-16 DIAGNOSIS — L84 CALLUS OF FOOT: ICD-10-CM

## 2025-06-16 DIAGNOSIS — M79.2 NEURALGIA: ICD-10-CM

## 2025-06-16 DIAGNOSIS — I69.359 HEMIPARESIS AFFECTING DOMINANT SIDE AS LATE EFFECT OF CEREBROVASCULAR ACCIDENT (MULTI): ICD-10-CM

## 2025-06-16 DIAGNOSIS — E66.01 MORBID OBESITY (MULTI): ICD-10-CM

## 2025-06-16 DIAGNOSIS — Z00.00 ROUTINE GENERAL MEDICAL EXAMINATION AT HEALTH CARE FACILITY: ICD-10-CM

## 2025-06-16 DIAGNOSIS — I10 BENIGN ESSENTIAL HYPERTENSION: ICD-10-CM

## 2025-06-16 DIAGNOSIS — N40.0 BENIGN PROSTATIC HYPERPLASIA WITHOUT LOWER URINARY TRACT SYMPTOMS: ICD-10-CM

## 2025-06-16 DIAGNOSIS — H35.033 HYPERTENSIVE RETINOPATHY, BILATERAL: ICD-10-CM

## 2025-06-16 DIAGNOSIS — N18.32 STAGE 3B CHRONIC KIDNEY DISEASE (MULTI): ICD-10-CM

## 2025-06-16 DIAGNOSIS — E53.8 VITAMIN B12 DEFICIENCY: ICD-10-CM

## 2025-06-16 PROBLEM — B02.29 POST HERPETIC NEURALGIA: Status: RESOLVED | Noted: 2023-08-28 | Resolved: 2025-06-16

## 2025-06-16 PROBLEM — R97.20 PSA ELEVATION: Status: RESOLVED | Noted: 2024-06-14 | Resolved: 2025-06-16

## 2025-06-16 PROBLEM — L30.9 DERMATITIS: Status: RESOLVED | Noted: 2023-04-20 | Resolved: 2025-06-16

## 2025-06-16 PROBLEM — R53.1 WEAKNESS: Status: RESOLVED | Noted: 2023-08-28 | Resolved: 2025-06-16

## 2025-06-16 PROCEDURE — 1158F ADVNC CARE PLAN TLK DOCD: CPT | Performed by: FAMILY MEDICINE

## 2025-06-16 PROCEDURE — G0439 PPPS, SUBSEQ VISIT: HCPCS | Performed by: FAMILY MEDICINE

## 2025-06-16 PROCEDURE — 1123F ACP DISCUSS/DSCN MKR DOCD: CPT | Performed by: FAMILY MEDICINE

## 2025-06-16 PROCEDURE — 99397 PER PM REEVAL EST PAT 65+ YR: CPT | Performed by: FAMILY MEDICINE

## 2025-06-16 PROCEDURE — 1170F FXNL STATUS ASSESSED: CPT | Performed by: FAMILY MEDICINE

## 2025-06-16 PROCEDURE — 3074F SYST BP LT 130 MM HG: CPT | Performed by: FAMILY MEDICINE

## 2025-06-16 PROCEDURE — 99214 OFFICE O/P EST MOD 30 MIN: CPT | Performed by: FAMILY MEDICINE

## 2025-06-16 PROCEDURE — 1159F MED LIST DOCD IN RCRD: CPT | Performed by: FAMILY MEDICINE

## 2025-06-16 PROCEDURE — 1036F TOBACCO NON-USER: CPT | Performed by: FAMILY MEDICINE

## 2025-06-16 PROCEDURE — 93000 ELECTROCARDIOGRAM COMPLETE: CPT | Performed by: FAMILY MEDICINE

## 2025-06-16 PROCEDURE — 3079F DIAST BP 80-89 MM HG: CPT | Performed by: FAMILY MEDICINE

## 2025-06-16 PROCEDURE — 99497 ADVNCD CARE PLAN 30 MIN: CPT | Performed by: FAMILY MEDICINE

## 2025-06-16 ASSESSMENT — ENCOUNTER SYMPTOMS
POLYPHAGIA: 0
CARDIOVASCULAR NEGATIVE: 1
ABDOMINAL DISTENTION: 0
ANAL BLEEDING: 0
JOINT SWELLING: 0
HEMATURIA: 0
NECK PAIN: 0
APPETITE CHANGE: 0
BACK PAIN: 0
EYE ITCHING: 0
CONSTIPATION: 0
HALLUCINATIONS: 0
NECK STIFFNESS: 0
SLEEP DISTURBANCE: 0
VOMITING: 0
ADENOPATHY: 0
NUMBNESS: 1
FREQUENCY: 0
UNEXPECTED WEIGHT CHANGE: 0
HEMATOLOGIC/LYMPHATIC NEGATIVE: 1
FLANK PAIN: 0
SINUS PAIN: 0
SPEECH DIFFICULTY: 0
HYPERACTIVE: 0
COLOR CHANGE: 0
NAUSEA: 0
RHINORRHEA: 1
AGITATION: 0
SEIZURES: 0
FACIAL ASYMMETRY: 0
SINUS PRESSURE: 0
ACTIVITY CHANGE: 0
PSYCHIATRIC NEGATIVE: 1
DYSPHORIC MOOD: 0
DIZZINESS: 0
STRIDOR: 0
POLYDIPSIA: 0
CHOKING: 0
CHILLS: 0
BRUISES/BLEEDS EASILY: 0
WEAKNESS: 1
MYALGIAS: 0
DECREASED CONCENTRATION: 0
TROUBLE SWALLOWING: 0
CHEST TIGHTNESS: 0
DIAPHORESIS: 0
DYSURIA: 0
DIARRHEA: 0
WOUND: 0
NERVOUS/ANXIOUS: 0
OCCASIONAL FEELINGS OF UNSTEADINESS: 1
EYE PAIN: 0
RECTAL PAIN: 0
PALPITATIONS: 0
COUGH: 0
EYE DISCHARGE: 0
ABDOMINAL PAIN: 0
EYE REDNESS: 0
FATIGUE: 0
ARTHRALGIAS: 1
SHORTNESS OF BREATH: 0
FACIAL SWELLING: 0
LOSS OF SENSATION IN FEET: 0
SORE THROAT: 0
BLOOD IN STOOL: 0
LIGHT-HEADEDNESS: 0
DIFFICULTY URINATING: 0
RESPIRATORY NEGATIVE: 1
GASTROINTESTINAL NEGATIVE: 1
HEADACHES: 0
DEPRESSION: 0
VOICE CHANGE: 0

## 2025-06-16 ASSESSMENT — ACTIVITIES OF DAILY LIVING (ADL)
DRESSING: INDEPENDENT
MANAGING_FINANCES: NEEDS ASSISTANCE
BATHING: INDEPENDENT
GROCERY_SHOPPING: NEEDS ASSISTANCE
DOING_HOUSEWORK: INDEPENDENT
TAKING_MEDICATION: INDEPENDENT

## 2025-06-16 NOTE — PATIENT INSTRUCTIONS
Given instructions on how to get rid of the callus.    Recommend doing shingles vaccination at the pharmacy information given EKG performed and reviewed no acute abnormalities    Labs were reviewed with you today in the office.  Medications reviewed and reconciled    Please continue present regimen would like to follow-up in 6 months

## 2025-06-16 NOTE — PROGRESS NOTES
Advance Care Planning Note     Discussion Date: 06/16/25   Discussion Participants: patient    The patient wishes to discuss Advance Care Planning today and the following is a brief summary of our discussion.     Patient has capacity to make their own medical decisions: Yes  Health Care Agent/Surrogate Decision Maker documented in chart: Yes    Documents on file and valid:  Advance Directive/Living Will: No   Health Care Power of : No  Other: discussed and code status updated  Full code  Communication of Medical Status/Prognosis:   good    Communication of Treatment Goals/Options:   good    Treatment Decisions  Goals of Care: survival is prioritized, if goals for quality or survival can reasonably be achieved   agree  Follow Up Plan  Discuss next year  Team Members  PCP  Time Statement: Total face to face time spent on advance care planning was 16 minutes with 16 minutes spent in counseling, including the explanation.    Tiago Troy, [employer]   Patient ID: Melchor Fang is a 85 y.o. male who presents for Medicare Annual Wellness Visit Subsequent.    Collous under Little toe.  Little bit of callus on the base of the left foot in the area of the fifth toe.  There is no redness or warmth he was working with podiatrist and they are doing scraping but it did not completely go away.  He otherwise send no chest pain no shortness of breath still has right hemiparesis.    No trouble with fever chills or night sweats no troubles with abdominal pain or discomfort no troubles with blood in the stool or black tarry stool no swelling legs or feet.             Alcohol intake: 3 an afternoon  Caffeine intake: 1 cup in am  Exercise: walking    Last Colonoscopy: n/a  Last Pap smear: N/A  Mammogram:N/A  Last Dexa scan:N/A    Shingles vaccine: recommended  TdaP vaccine:     Review of Systems   Constitutional:  Negative for activity change, appetite change, chills, diaphoresis, fatigue and unexpected weight change.    HENT:  Positive for congestion and rhinorrhea. Negative for dental problem, ear discharge, ear pain, facial swelling, hearing loss, mouth sores, nosebleeds, postnasal drip, sinus pressure, sinus pain, sneezing, sore throat, tinnitus, trouble swallowing and voice change.    Eyes:  Negative for pain, discharge, redness, itching and visual disturbance.        Had  retinopathy   Respiratory: Negative.  Negative for cough, choking, chest tightness, shortness of breath and stridor.         Hacking mucous   Cardiovascular: Negative.  Negative for chest pain, palpitations and leg swelling.   Gastrointestinal: Negative.  Negative for abdominal distention, abdominal pain, anal bleeding, blood in stool, constipation, diarrhea, nausea, rectal pain and vomiting.   Endocrine: Negative for cold intolerance, heat intolerance, polydipsia, polyphagia and polyuria.   Genitourinary:  Positive for enuresis (once at night1 time at night). Negative for difficulty urinating, dysuria, flank pain, frequency, hematuria, penile pain, scrotal swelling and testicular pain.   Musculoskeletal:  Positive for arthralgias. Negative for back pain, joint swelling, myalgias, neck pain and neck stiffness.        Knee pain.    L sore ness   Skin:  Negative for color change, rash and wound.        Some callusing of the foot   Allergic/Immunologic: Negative for environmental allergies, food allergies and immunocompromised state.   Neurological:  Positive for weakness and numbness. Negative for dizziness, seizures, syncope, facial asymmetry, speech difficulty, light-headedness and headaches.        Slight R hemiparesis   Hematological: Negative.  Negative for adenopathy. Does not bruise/bleed easily.   Psychiatric/Behavioral: Negative.  Negative for agitation, behavioral problems, decreased concentration, dysphoric mood, hallucinations, sleep disturbance and suicidal ideas. The patient is not nervous/anxious and is not hyperactive.        Objective   BP  "126/80   Pulse 81   Temp 37 °C (98.6 °F)   Ht 1.689 m (5' 6.5\")   Wt 102 kg (225 lb)   SpO2 96%   BMI 35.77 kg/m²   BSA Body surface area is 2.19 meters squared.      Physical Exam  Constitutional:       General: He is not in acute distress.     Appearance: Normal appearance. He is obese. He is not ill-appearing or toxic-appearing.   HENT:      Head: Normocephalic.      Right Ear: Tympanic membrane normal.      Left Ear: Tympanic membrane normal.      Nose: Nose normal.      Mouth/Throat:      Mouth: Mucous membranes are moist.   Eyes:      Extraocular Movements: Extraocular movements intact.      Conjunctiva/sclera: Conjunctivae normal.      Pupils: Pupils are equal, round, and reactive to light.   Cardiovascular:      Rate and Rhythm: Normal rate and regular rhythm.      Pulses: Normal pulses.      Heart sounds: Normal heart sounds.   Pulmonary:      Effort: Pulmonary effort is normal. No respiratory distress.      Breath sounds: Normal breath sounds. No wheezing or rhonchi.   Abdominal:      General: Abdomen is flat. Bowel sounds are normal. There is no distension.      Palpations: Abdomen is soft.      Tenderness: There is no abdominal tenderness. There is no right CVA tenderness or rebound.      Hernia: No hernia is present.   Genitourinary:     Penis: Normal.       Testes: Normal.      Comments: No significant enlargement of prostate noted no nodularity    Slight erythema in the intertriginous areas  Musculoskeletal:         General: Normal range of motion.      Cervical back: Normal range of motion.      Right lower leg: No edema.   Skin:     General: Skin is warm and dry.      Capillary Refill: Capillary refill takes less than 2 seconds. Patient still having generalized weakness of the lower extremities bilaterally.  Cranial nerves II through XII otherwise intact.    Strong handgrip strength noted     Findings: No bruising.   Neurological:      Mental Status: He is alert and oriented to person, place, " and time. Mental status is at baseline.      Cranial Nerves: No cranial nerve deficit.      Sensory: No sensory deficit.      Motor: No weakness.      Coordination: Coordination normal.      Gait: Gait abnormal.      Deep Tendon Reflexes: Reflexes normal.      Comments: Some shuffling of the gait noted.    Using a cane to ambulate    Handgrip appears to be strong bilaterally.  Slight incoordination of right hand.    The right leg reveals good range of motion.  Left leg reveals good range of motion.  Deep tendon reflexes are equal and strong bilaterally.    Finger-nose testing intact as well.   Psychiatric:         Mood and Affect: Mood normal.         Behavior: Behavior normal.         Thought Content: Thought content normal.       Orders Only on 03/21/2025   Component Date Value Ref Range Status    WHITE BLOOD CELL COUNT 06/09/2025 5.9  3.8 - 10.8 Thousand/uL Final    RED BLOOD CELL COUNT 06/09/2025 4.46  4.20 - 5.80 Million/uL Final    HEMOGLOBIN 06/09/2025 15.0  13.2 - 17.1 g/dL Final    HEMATOCRIT 06/09/2025 45.3  38.5 - 50.0 % Final    MCV 06/09/2025 101.6 (H)  80.0 - 100.0 fL Final    MCH 06/09/2025 33.6 (H)  27.0 - 33.0 pg Final    MCHC 06/09/2025 33.1  32.0 - 36.0 g/dL Final    Comment: For adults, a slight decrease in the calculated MCHC  value (in the range of 30 to 32 g/dL) is most likely  not clinically significant; however, it should be  interpreted with caution in correlation with other  red cell parameters and the patient's clinical  condition.      RDW 06/09/2025 13.1  11.0 - 15.0 % Final    PLATELET COUNT 06/09/2025 164  140 - 400 Thousand/uL Final    MPV 06/09/2025 10.4  7.5 - 12.5 fL Final    ABSOLUTE NEUTROPHILS 06/09/2025 2,555  1,500 - 7,800 cells/uL Final    ABSOLUTE LYMPHOCYTES 06/09/2025 2,419  850 - 3,900 cells/uL Final    ABSOLUTE MONOCYTES 06/09/2025 496  200 - 950 cells/uL Final    ABSOLUTE EOSINOPHILS 06/09/2025 372  15 - 500 cells/uL Final    ABSOLUTE BASOPHILS 06/09/2025 59  0 - 200  cells/uL Final    NEUTROPHILS 06/09/2025 43.3  % Final    LYMPHOCYTES 06/09/2025 41.0  % Final    MONOCYTES 06/09/2025 8.4  % Final    EOSINOPHILS 06/09/2025 6.3  % Final    BASOPHILS 06/09/2025 1.0  % Final    GLUCOSE 06/09/2025 100 (H)  65 - 99 mg/dL Final    Comment:               Fasting reference interval     For someone without known diabetes, a glucose value  between 100 and 125 mg/dL is consistent with  prediabetes and should be confirmed with a  follow-up test.         UREA NITROGEN (BUN) 06/09/2025 24  7 - 25 mg/dL Final    CREATININE 06/09/2025 1.83 (H)  0.70 - 1.22 mg/dL Final    EGFR 06/09/2025 36 (L)  > OR = 60 mL/min/1.73m2 Final    SODIUM 06/09/2025 139  135 - 146 mmol/L Final    POTASSIUM 06/09/2025 4.9  3.5 - 5.3 mmol/L Final    CHLORIDE 06/09/2025 105  98 - 110 mmol/L Final    CARBON DIOXIDE 06/09/2025 25  20 - 32 mmol/L Final    ELECTROLYTE BALANCE 06/09/2025 9  7 - 17 mmol/L (calc) Final    CALCIUM 06/09/2025 9.4  8.6 - 10.3 mg/dL Final    PROTEIN, TOTAL 06/09/2025 7.7  6.1 - 8.1 g/dL Final    ALBUMIN 06/09/2025 4.3  3.6 - 5.1 g/dL Final    BILIRUBIN, TOTAL 06/09/2025 0.7  0.2 - 1.2 mg/dL Final    ALKALINE PHOSPHATASE 06/09/2025 50  35 - 144 U/L Final    AST 06/09/2025 15  10 - 35 U/L Final    ALT 06/09/2025 14  9 - 46 U/L Final    CHOLESTEROL, TOTAL 06/09/2025 138  <200 mg/dL Final    HDL CHOLESTEROL 06/09/2025 38 (L)  > OR = 40 mg/dL Final    TRIGLYCERIDES 06/09/2025 209 (H)  <150 mg/dL Final    Comment:    If a non-fasting specimen was collected, consider  repeat triglyceride testing on a fasting specimen  if clinically indicated.   Radha et al. J. of Clin. Lipidol. 2015;9:129-169.         LDL-CHOLESTEROL 06/09/2025 70  mg/dL (calc) Final    Comment: Reference range: <100     Desirable range <100 mg/dL for primary prevention;    <70 mg/dL for patients with CHD or diabetic patients   with > or = 2 CHD risk factors.     LDL-C is now calculated using the Miguel A-Benito   calculation, which is  a validated novel method providing   better accuracy than the Friedewald equation in the   estimation of LDL-C.   Miguel A SS et al. HERB. 2013;310(19): 7479-4797   (http://education.Applika/faq/CXP605)      CHOL/HDLC RATIO 06/09/2025 3.6  <5.0 (calc) Final    NON HDL CHOLESTEROL 06/09/2025 100  <130 mg/dL (calc) Final    Comment: For patients with diabetes plus 1 major ASCVD risk   factor, treating to a non-HDL-C goal of <100 mg/dL   (LDL-C of <70 mg/dL) is considered a therapeutic   option.      TSH W/REFLEX TO FT4 06/09/2025 3.84  0.40 - 4.50 mIU/L Final    PSA, TOTAL 06/09/2025 0.76  < OR = 4.00 ng/mL Final    Comment: The total PSA value from this assay system is   standardized against the WHO standard. The test   result will be approximately 20% lower when compared   to the equimolar-standardized total PSA ([place]). Comparison of serial PSA results should be   interpreted with this fact in mind.     This test was performed using the Siemens   chemiluminescent method. Values obtained from   different assay methods cannot be used  interchangeably. PSA levels, regardless of  value, should not be interpreted as absolute  evidence of the presence or absence of disease.       Medications Ordered Prior to Encounter[1]  No images are attached to the encounter.            Assessment/Plan   Problem List Items Addressed This Visit           ICD-10-CM    Morbid obesity (Multi) E66.01    This has been improved continue to work toward weight loss         Stage 3b chronic kidney disease (Multi) N18.32    Reviewed lab work this is stable         Benign essential hypertension I10    Blood pressure well-controlled         Benign prostatic hyperplasia without lower urinary tract symptoms N40.0    PSA level is normal         Hypertensive retinopathy, bilateral H35.033    Continue to follow-up with the retina specialist         Neuralgia M79.2    Vitamin B12 deficiency E53.8    Encounter for annual wellness  visit (AWV) in Medicare patient Z00.00    Hemiparesis affecting dominant side as late effect of cerebrovascular accident (Multi) - Primary I69.359    This has been stable improved since hospitalization         Corn of foot L84                 [1]   Current Outpatient Medications on File Prior to Visit   Medication Sig Dispense Refill    allopurinol (Zyloprim) 100 mg tablet Take 1 tablet (100 mg) by mouth once daily.      aspirin 81 mg EC tablet Take 1 tablet (81 mg) by mouth once daily.      busPIRone (Buspar) 15 mg tablet TAKE 1 TABLET BY MOUTH EVERY 12 HOURS 180 tablet 1    clopidogrel (Plavix) 75 mg tablet TAKE 1 TABLET BY MOUTH ONCE DAILY 90 tablet 1    famotidine (Pepcid) 40 mg tablet TAKE 1 TABLET BY MOUTH AT BEDTIME 30 tablet 3    gabapentin (Neurontin) 300 mg capsule TAKE 1 CAPSULE BY MOUTH IN THE MORNING and TAKE 2 capsules AT BEDTIME 180 capsule 1    losartan (Cozaar) 50 mg tablet TAKE 1 TABLET BY MOUTH EVERY DAY 90 tablet 1    mirtazapine (Remeron) 15 mg tablet TAKE 1 TABLET BY MOUTH AT BEDTIME 90 tablet 1    simvastatin (Zocor) 20 mg tablet TAKE 1 TABLET BY MOUTH ONCE DAILY 90 tablet 1    triamcinolone (Kenalog) 0.1 % cream Apply topically 2 times a day. Apply to affected area 1-2 times daily as needed. 454 g 1     No current facility-administered medications on file prior to visit.

## 2025-07-26 DIAGNOSIS — K29.70 GASTRITIS, UNSPECIFIED, WITHOUT BLEEDING: ICD-10-CM

## 2025-07-26 RX ORDER — FAMOTIDINE 40 MG/1
40 TABLET, FILM COATED ORAL NIGHTLY
Qty: 30 TABLET | Refills: 3 | Status: SHIPPED | OUTPATIENT
Start: 2025-07-26

## 2025-08-12 DIAGNOSIS — M79.2 NEURALGIA: ICD-10-CM

## 2025-08-12 DIAGNOSIS — R53.1 WEAKNESS: ICD-10-CM
